# Patient Record
Sex: MALE | Race: WHITE | NOT HISPANIC OR LATINO | Employment: FULL TIME | URBAN - METROPOLITAN AREA
[De-identification: names, ages, dates, MRNs, and addresses within clinical notes are randomized per-mention and may not be internally consistent; named-entity substitution may affect disease eponyms.]

---

## 2018-08-23 ENCOUNTER — TRANSCRIBE ORDERS (OUTPATIENT)
Dept: RADIOLOGY | Age: 53
End: 2018-08-23

## 2018-08-23 ENCOUNTER — HOSPITAL ENCOUNTER (OUTPATIENT)
Dept: RADIOLOGY | Age: 53
Discharge: HOME | End: 2018-08-23
Attending: FAMILY MEDICINE
Payer: COMMERCIAL

## 2018-08-23 DIAGNOSIS — M25.561 PAIN IN RIGHT KNEE: Primary | ICD-10-CM

## 2018-08-23 DIAGNOSIS — M25.561 PAIN IN RIGHT KNEE: ICD-10-CM

## 2018-08-23 PROCEDURE — 73562 X-RAY EXAM OF KNEE 3: CPT | Mod: RT

## 2018-12-17 ENCOUNTER — APPOINTMENT (EMERGENCY)
Dept: RADIOLOGY | Facility: HOSPITAL | Age: 53
End: 2018-12-17
Payer: COMMERCIAL

## 2018-12-17 ENCOUNTER — HOSPITAL ENCOUNTER (EMERGENCY)
Facility: HOSPITAL | Age: 53
Discharge: HOME | End: 2018-12-17
Attending: EMERGENCY MEDICINE
Payer: COMMERCIAL

## 2018-12-17 VITALS
TEMPERATURE: 98.4 F | HEART RATE: 99 BPM | RESPIRATION RATE: 20 BRPM | DIASTOLIC BLOOD PRESSURE: 79 MMHG | BODY MASS INDEX: 30.48 KG/M2 | WEIGHT: 230 LBS | OXYGEN SATURATION: 97 % | SYSTOLIC BLOOD PRESSURE: 179 MMHG | HEIGHT: 73 IN

## 2018-12-17 DIAGNOSIS — S49.92XA INJURY OF LEFT SHOULDER, INITIAL ENCOUNTER: Primary | ICD-10-CM

## 2018-12-17 PROCEDURE — 73030 X-RAY EXAM OF SHOULDER: CPT | Mod: RT

## 2018-12-17 PROCEDURE — 99283 EMERGENCY DEPT VISIT LOW MDM: CPT | Mod: 25

## 2018-12-17 PROCEDURE — 93005 ELECTROCARDIOGRAM TRACING: CPT

## 2018-12-17 PROCEDURE — 93005 ELECTROCARDIOGRAM TRACING: CPT | Performed by: EMERGENCY MEDICINE

## 2018-12-17 RX ORDER — OXYCODONE AND ACETAMINOPHEN 5; 325 MG/1; MG/1
1 TABLET ORAL EVERY 4 HOURS PRN
Qty: 10 TABLET | Refills: 0 | Status: SHIPPED | OUTPATIENT
Start: 2018-12-17 | End: 2018-12-27

## 2018-12-17 RX ORDER — ROSUVASTATIN CALCIUM 10 MG/1
TABLET, COATED ORAL
COMMUNITY
End: 2024-02-26

## 2018-12-17 RX ORDER — VALSARTAN 160 MG/1
160 TABLET ORAL DAILY
COMMUNITY
End: 2024-02-26

## 2018-12-17 RX ORDER — PREDNISONE 20 MG/1
40 TABLET ORAL DAILY
Qty: 10 TABLET | Refills: 0 | Status: SHIPPED | OUTPATIENT
Start: 2018-12-17 | End: 2018-12-22

## 2018-12-17 RX ORDER — ASPIRIN 81 MG/1
81 TABLET ORAL DAILY
COMMUNITY
End: 2020-08-11

## 2018-12-17 ASSESSMENT — ENCOUNTER SYMPTOMS
NUMBNESS: 0
VOMITING: 0
SHORTNESS OF BREATH: 0
WEAKNESS: 0
ARTHRALGIAS: 1
SORE THROAT: 0
ABDOMINAL PAIN: 0
DIARRHEA: 0
COLOR CHANGE: 0
FEVER: 0
COUGH: 0

## 2018-12-17 NOTE — DISCHARGE INSTRUCTIONS
You were seen in the ED for a shoulder injury. X-rays showed no fractures. Please wear the sling provided and follow up with your PCP and the orthopedist listed below for follow up, please return to the ED with any concerns.       Anti-inflammatory dosing for NSAIDs:  Please take ibuprofen (motrin or advil) for pain and inflammation. Each tablet usually has 200 mg, please take 3 tabs (600 mg) every 6 hours or 4 tabs (800 mg) every 8 hours, around the clock for the next couple days. Please take each dose with food to prevent stomach upset. At this dose you will get the anti-inflammatory benefits of NSAIDs in addition to pain relief.

## 2018-12-17 NOTE — ED PROVIDER NOTES
HPI     Chief Complaint   Patient presents with   • Shoulder Pain     54 y/o male, right hand dominant, with pmh of AFIB on 81 mg ASA, HTN, HLD presents with left shoulder pain x 3 weeks. Pt states pain became acutely worse today. States pain is worse with movement. No pmh of rotator cuff injury or shoulder injury. Denies trauma to the area or heavy lifting. Pt has had 6 x 325mg tylenol today without relief. Pt has had percocet which he had at home with some relief.    Ortho: Dr. Estuardo Subramanian, pt has appointment on Friday 12/21/18      History provided by:  Patient   used: No    Shoulder Pain   Location:  Left  Quality:  Dull ache  Severity:  Moderate  Onset quality:  Gradual  Duration:  3 weeks  Timing:  Constant  Progression:  Worsening  Chronicity:  New  Worsened by:  Movements  Ineffective treatments:  Tylenol  Associated symptoms: no abdominal pain, no chest pain, no cough, no diarrhea, no fever, no shortness of breath, no sore throat and no vomiting         Patient History     Past Medical History:   Diagnosis Date   • Atrial fibrillation (CMS/HCC)    • Hypertension    • Kidney stone    • Lipid disorder        Past Surgical History:   Procedure Laterality Date   • CARDIAC ELECTROPHYSIOLOGY MAPPING AND ABLATION     • LITHOTRIPSY         History reviewed. No pertinent family history.    Social History   Substance Use Topics   • Smoking status: Never Smoker   • Smokeless tobacco: Never Used   • Alcohol use Yes       Systems Reviewed from Nursing Triage:          Review of Systems     Review of Systems   Constitutional: Negative for fever.   HENT: Negative for sore throat.    Respiratory: Negative for cough and shortness of breath.    Cardiovascular: Negative for chest pain.   Gastrointestinal: Negative for abdominal pain, diarrhea and vomiting.   Musculoskeletal: Positive for arthralgias (left shoulder pain).   Skin: Negative for color change.   Neurological: Negative for weakness and  "numbness.        Physical Exam     ED Triage Vitals [12/17/18 1444]   Temp Heart Rate Resp BP SpO2   36.9 °C (98.4 °F) 99 20 (!) 179/79 97 %      Temp Source Heart Rate Source Patient Position BP Location FiO2 (%) (Set)   Tympanic -- -- -- --                     Patient Vitals for the past 24 hrs:   BP Temp Temp src Pulse Resp SpO2 Height Weight   12/17/18 1444 (!) 179/79 36.9 °C (98.4 °F) Tympanic 99 20 97 % 1.854 m (6' 1\") 104 kg (230 lb)           Physical Exam   Constitutional: He is oriented to person, place, and time. He appears well-developed. No distress.   HENT:   Head: Atraumatic.   Eyes: Conjunctivae are normal.   Neck: Normal range of motion.   Pulmonary/Chest: Effort normal.   Musculoskeletal: He exhibits no deformity.        Left shoulder: He exhibits decreased range of motion, tenderness and pain. He exhibits no deformity and normal pulse.   Clavicle and scapula is nontender  Hawking-Logan test positive  Mike lift off test positive  NV intact   Neurological: He is alert and oriented to person, place, and time.   Skin: Skin is warm and dry.   Psychiatric: He has a normal mood and affect. His behavior is normal.   Nursing note and vitals reviewed.           Procedures    ED Course & [facility]     Labs Reviewed - No data to display    ECG 12 lead    (Results Pending)   X-RAY SHOULDER RIGHT 2+ VIEWS    (Results Pending)               [facility]         ED Course as of Dec 17 1634   Mon Dec 17, 2018   1532 Pt at XR. Will examine him after.  [KM]   1609 IMPRESSION:  No evidence of acute fracture or malalignment. X-RAY SHOULDER RIGHT 2+ VIEWS [KM]   1633 Discharge with percocet and prednisone prescription. Pt has close follow up with Dr. Subramanian, 12/21/18.  [KM]      ED Course User Index  [KM] Erica Snider         Clinical Impressions as of Dec 17 1634   Injury of left shoulder, initial encounter      By signing my name below, I, Erica Snider, attest that this documentation has been prepared under the direction and " in the presence of RAE Love PA-C.    12/17/2018 3:29 PM      I, Lori Love, personally performed the services described in this documentation, as documented by the scribe in my presence, and it is both accurate and complete.  12/18/2018 12:12 AM       Erica Snider  12/17/18 1637       Lori Campbell PA C  12/18/18 0012

## 2018-12-18 LAB
ATRIAL RATE: 100
P AXIS: -3
PR INTERVAL: 158
QRS DURATION: 72
QT INTERVAL: 328
QTC CALCULATION(BAZETT): 423
R AXIS: 63
T WAVE AXIS: 6
VENTRICULAR RATE: 100

## 2018-12-19 NOTE — ED ATTESTATION NOTE
The patient was evaluated and managed by the physician assistant / nurse practitioner.     Ness East MD  12/19/18 9117

## 2019-01-30 ENCOUNTER — TRANSCRIBE ORDERS (OUTPATIENT)
Dept: SCHEDULING | Age: 54
End: 2019-01-30

## 2019-01-30 ENCOUNTER — HOSPITAL ENCOUNTER (OUTPATIENT)
Dept: RADIOLOGY | Facility: HOSPITAL | Age: 54
Discharge: HOME | End: 2019-01-30
Attending: FAMILY MEDICINE
Payer: COMMERCIAL

## 2019-01-30 DIAGNOSIS — M25.512 PAIN IN LEFT SHOULDER: ICD-10-CM

## 2019-01-30 DIAGNOSIS — M25.512 PAIN IN LEFT SHOULDER: Primary | ICD-10-CM

## 2019-05-27 ENCOUNTER — HOSPITAL ENCOUNTER (EMERGENCY)
Facility: HOSPITAL | Age: 54
Discharge: HOME | End: 2019-05-27
Attending: EMERGENCY MEDICINE
Payer: COMMERCIAL

## 2019-05-27 VITALS
SYSTOLIC BLOOD PRESSURE: 141 MMHG | BODY MASS INDEX: 31.15 KG/M2 | OXYGEN SATURATION: 98 % | RESPIRATION RATE: 16 BRPM | WEIGHT: 230 LBS | TEMPERATURE: 98.4 F | HEART RATE: 81 BPM | DIASTOLIC BLOOD PRESSURE: 95 MMHG | HEIGHT: 72 IN

## 2019-05-27 DIAGNOSIS — H66.001 ACUTE SUPPURATIVE OTITIS MEDIA OF RIGHT EAR WITHOUT SPONTANEOUS RUPTURE OF TYMPANIC MEMBRANE, RECURRENCE NOT SPECIFIED: ICD-10-CM

## 2019-05-27 DIAGNOSIS — J01.00 ACUTE NON-RECURRENT MAXILLARY SINUSITIS: Primary | ICD-10-CM

## 2019-05-27 LAB
ALBUMIN SERPL-MCNC: 3.9 G/DL (ref 3.4–5)
ALP SERPL-CCNC: 75 IU/L (ref 35–126)
ALT SERPL-CCNC: 57 IU/L (ref 16–63)
ANION GAP SERPL CALC-SCNC: 11 MEQ/L (ref 3–15)
AST SERPL-CCNC: 27 IU/L (ref 15–41)
BASOPHILS # BLD: 0.07 K/UL (ref 0.01–0.1)
BASOPHILS NFR BLD: 0.6 %
BILIRUB SERPL-MCNC: 1.4 MG/DL (ref 0.3–1.2)
BUN SERPL-MCNC: 15 MG/DL (ref 8–20)
CALCIUM SERPL-MCNC: 8.7 MG/DL (ref 8.9–10.3)
CHLORIDE SERPL-SCNC: 106 MEQ/L (ref 98–109)
CO2 SERPL-SCNC: 21 MEQ/L (ref 22–32)
CREAT SERPL-MCNC: 0.9 MG/DL
DIFFERENTIAL METHOD BLD: ABNORMAL
EOSINOPHIL # BLD: 0.28 K/UL (ref 0.04–0.54)
EOSINOPHIL NFR BLD: 2.5 %
ERYTHROCYTE [DISTWIDTH] IN BLOOD BY AUTOMATED COUNT: 13 % (ref 11.6–14.4)
GFR SERPL CREATININE-BSD FRML MDRD: >60 ML/MIN/1.73M*2
GLUCOSE SERPL-MCNC: 167 MG/DL (ref 70–99)
HCT VFR BLDCO AUTO: 47.6 %
HGB BLD-MCNC: 16 G/DL
IMM GRANULOCYTES # BLD AUTO: 0.03 K/UL (ref 0–0.08)
IMM GRANULOCYTES NFR BLD AUTO: 0.3 %
LYMPHOCYTES # BLD: 2.54 K/UL (ref 1.2–3.5)
LYMPHOCYTES NFR BLD: 22.4 %
MCH RBC QN AUTO: 28.8 PG (ref 28–33.2)
MCHC RBC AUTO-ENTMCNC: 33.6 G/DL (ref 32.2–36.5)
MCV RBC AUTO: 85.8 FL (ref 83–98)
MONOCYTES # BLD: 0.91 K/UL (ref 0.3–1)
MONOCYTES NFR BLD: 8 %
NEUTROPHILS # BLD: 7.5 K/UL (ref 1.7–7)
NEUTS SEG NFR BLD: 66.2 %
NRBC BLD-RTO: 0 %
PDW BLD AUTO: 9.3 FL (ref 9.4–12.4)
PLATELET # BLD AUTO: 272 K/UL
POTASSIUM SERPL-SCNC: 3.6 MEQ/L (ref 3.6–5.1)
PROT SERPL-MCNC: 6.8 G/DL (ref 6–8.2)
RBC # BLD AUTO: 5.55 M/UL (ref 4.5–5.8)
SODIUM SERPL-SCNC: 138 MEQ/L (ref 136–144)
WBC # BLD AUTO: 11.33 K/UL

## 2019-05-27 PROCEDURE — 80053 COMPREHEN METABOLIC PANEL: CPT | Performed by: PHYSICIAN ASSISTANT

## 2019-05-27 PROCEDURE — 36415 COLL VENOUS BLD VENIPUNCTURE: CPT | Performed by: PHYSICIAN ASSISTANT

## 2019-05-27 PROCEDURE — 63600000 HC DRUGS/DETAIL CODE: Performed by: PHYSICIAN ASSISTANT

## 2019-05-27 PROCEDURE — 25800000 HC PHARMACY IV SOLUTIONS: Performed by: PHYSICIAN ASSISTANT

## 2019-05-27 PROCEDURE — 87081 CULTURE SCREEN ONLY: CPT | Performed by: EMERGENCY MEDICINE

## 2019-05-27 PROCEDURE — 85025 COMPLETE CBC W/AUTO DIFF WBC: CPT | Performed by: PHYSICIAN ASSISTANT

## 2019-05-27 PROCEDURE — 63700000 HC SELF-ADMINISTRABLE DRUG: Performed by: PHYSICIAN ASSISTANT

## 2019-05-27 PROCEDURE — 99284 EMERGENCY DEPT VISIT MOD MDM: CPT

## 2019-05-27 RX ORDER — DIPHENHYDRAMINE HCL 50 MG/ML
25 VIAL (ML) INJECTION ONCE
Status: COMPLETED | OUTPATIENT
Start: 2019-05-27 | End: 2019-05-27

## 2019-05-27 RX ORDER — AMOXICILLIN AND CLAVULANATE POTASSIUM 875; 125 MG/1; MG/1
1 TABLET, FILM COATED ORAL
Qty: 14 TABLET | Refills: 0 | Status: SHIPPED | OUTPATIENT
Start: 2019-05-27 | End: 2019-05-27

## 2019-05-27 RX ORDER — ACETAMINOPHEN 325 MG/1
975 TABLET ORAL ONCE
Status: COMPLETED | OUTPATIENT
Start: 2019-05-27 | End: 2019-05-27

## 2019-05-27 RX ORDER — METOCLOPRAMIDE HYDROCHLORIDE 5 MG/ML
10 INJECTION INTRAMUSCULAR; INTRAVENOUS ONCE
Status: COMPLETED | OUTPATIENT
Start: 2019-05-27 | End: 2019-05-27

## 2019-05-27 RX ORDER — AMOXICILLIN AND CLAVULANATE POTASSIUM 875; 125 MG/1; MG/1
1 TABLET, FILM COATED ORAL
Qty: 20 TABLET | Refills: 0 | Status: SHIPPED | OUTPATIENT
Start: 2019-05-27 | End: 2019-06-06

## 2019-05-27 RX ADMIN — SODIUM CHLORIDE 1000 ML: 9 INJECTION, SOLUTION INTRAVENOUS at 12:08

## 2019-05-27 RX ADMIN — METOCLOPRAMIDE 10 MG: 5 INJECTION, SOLUTION INTRAMUSCULAR; INTRAVENOUS at 12:15

## 2019-05-27 RX ADMIN — ACETAMINOPHEN 975 MG: 325 TABLET, FILM COATED ORAL at 12:09

## 2019-05-27 RX ADMIN — DIPHENHYDRAMINE HYDROCHLORIDE 25 MG: 50 INJECTION INTRAMUSCULAR; INTRAVENOUS at 12:13

## 2019-05-29 LAB — B-HEM STREP SPEC QL CULT: NORMAL

## 2020-08-11 ENCOUNTER — APPOINTMENT (EMERGENCY)
Dept: RADIOLOGY | Facility: HOSPITAL | Age: 55
End: 2020-08-11
Payer: COMMERCIAL

## 2020-08-11 ENCOUNTER — APPOINTMENT (EMERGENCY)
Dept: RADIOLOGY | Facility: HOSPITAL | Age: 55
End: 2020-08-11
Attending: EMERGENCY MEDICINE
Payer: COMMERCIAL

## 2020-08-11 ENCOUNTER — HOSPITAL ENCOUNTER (EMERGENCY)
Facility: HOSPITAL | Age: 55
Discharge: HOME | End: 2020-08-11
Attending: EMERGENCY MEDICINE
Payer: COMMERCIAL

## 2020-08-11 VITALS
SYSTOLIC BLOOD PRESSURE: 102 MMHG | HEIGHT: 74 IN | OXYGEN SATURATION: 96 % | WEIGHT: 235 LBS | TEMPERATURE: 98.8 F | HEART RATE: 80 BPM | RESPIRATION RATE: 16 BRPM | BODY MASS INDEX: 30.16 KG/M2 | DIASTOLIC BLOOD PRESSURE: 61 MMHG

## 2020-08-11 DIAGNOSIS — J18.9 PNEUMONIA DUE TO INFECTIOUS ORGANISM, UNSPECIFIED LATERALITY, UNSPECIFIED PART OF LUNG: Primary | ICD-10-CM

## 2020-08-11 DIAGNOSIS — E87.6 HYPOKALEMIA: ICD-10-CM

## 2020-08-11 DIAGNOSIS — K82.8 GALLBLADDER SLUDGE: ICD-10-CM

## 2020-08-11 LAB
ALBUMIN SERPL-MCNC: 4.2 G/DL (ref 3.4–5)
ALP SERPL-CCNC: 66 IU/L (ref 35–126)
ALT SERPL-CCNC: 36 IU/L (ref 16–63)
ANION GAP SERPL CALC-SCNC: 13 MEQ/L (ref 3–15)
AST SERPL-CCNC: 25 IU/L (ref 15–41)
BACTERIA #/AREA URNS HPF: ABNORMAL /HPF
BASOPHILS # BLD: 0.08 K/UL (ref 0.01–0.1)
BASOPHILS NFR BLD: 0.6 %
BILIRUB SERPL-MCNC: 2.7 MG/DL (ref 0.3–1.2)
BILIRUB UR QL STRIP.AUTO: 1 MG/DL
BUN SERPL-MCNC: 21 MG/DL (ref 8–20)
CALCIUM SERPL-MCNC: 8.8 MG/DL (ref 8.9–10.3)
CHLORIDE SERPL-SCNC: 98 MEQ/L (ref 98–109)
CLARITY UR REFRACT.AUTO: ABNORMAL
CO2 SERPL-SCNC: 24 MEQ/L (ref 22–32)
COLOR UR AUTO: ABNORMAL
CREAT SERPL-MCNC: 1 MG/DL (ref 0.8–1.3)
DIFFERENTIAL METHOD BLD: ABNORMAL
EOSINOPHIL # BLD: 0.27 K/UL (ref 0.04–0.54)
EOSINOPHIL NFR BLD: 2 %
ERYTHROCYTE [DISTWIDTH] IN BLOOD BY AUTOMATED COUNT: 13.2 % (ref 11.6–14.4)
GFR SERPL CREATININE-BSD FRML MDRD: >60 ML/MIN/1.73M*2
GLUCOSE SERPL-MCNC: 112 MG/DL (ref 70–99)
GLUCOSE UR STRIP.AUTO-MCNC: NEGATIVE MG/DL
GRAN CASTS #/AREA URNS LPF: ABNORMAL /LPF
HCT VFR BLDCO AUTO: 46.6 % (ref 40.1–51)
HGB BLD-MCNC: 15.5 G/DL (ref 13.7–17.5)
HGB UR QL STRIP.AUTO: NEGATIVE
HYALINE CASTS #/AREA URNS LPF: ABNORMAL /LPF
IMM GRANULOCYTES # BLD AUTO: 0.04 K/UL (ref 0–0.08)
IMM GRANULOCYTES NFR BLD AUTO: 0.3 %
KETONES UR STRIP.AUTO-MCNC: NEGATIVE MG/DL
LACTATE SERPL-SCNC: 1.1 MMOL/L (ref 0.4–2)
LEUKOCYTE ESTERASE UR QL STRIP.AUTO: ABNORMAL
LIPASE SERPL-CCNC: 22 U/L (ref 20–51)
LYMPHOCYTES # BLD: 2.38 K/UL (ref 1.2–3.5)
LYMPHOCYTES NFR BLD: 17.9 %
MCH RBC QN AUTO: 28.1 PG (ref 28–33.2)
MCHC RBC AUTO-ENTMCNC: 33.3 G/DL (ref 32.2–36.5)
MCV RBC AUTO: 84.6 FL (ref 83–98)
MONOCYTES # BLD: 1.24 K/UL (ref 0.3–1)
MONOCYTES NFR BLD: 9.3 %
NEUTROPHILS # BLD: 9.3 K/UL (ref 1.7–7)
NEUTS SEG NFR BLD: 69.9 %
NITRITE UR QL STRIP.AUTO: NEGATIVE
NRBC BLD-RTO: 0 %
PDW BLD AUTO: 9.4 FL (ref 9.4–12.4)
PH UR STRIP.AUTO: 5.5 [PH]
PLATELET # BLD AUTO: 294 K/UL (ref 150–350)
POTASSIUM SERPL-SCNC: 3 MEQ/L (ref 3.6–5.1)
PROT SERPL-MCNC: 7.7 G/DL (ref 6–8.2)
PROT UR QL STRIP.AUTO: 1
RBC # BLD AUTO: 5.51 M/UL (ref 4.5–5.8)
RBC #/AREA URNS HPF: ABNORMAL /HPF
SODIUM SERPL-SCNC: 135 MEQ/L (ref 136–144)
SP GR UR REFRACT.AUTO: 1.03
SQUAMOUS URNS QL MICRO: 1 /HPF
TROPONIN I SERPL-MCNC: <0.02 NG/ML
TSH SERPL DL<=0.05 MIU/L-ACNC: 3.89 MIU/L (ref 0.34–5.6)
UROBILINOGEN UR STRIP-ACNC: 1 EU/DL
WBC # BLD AUTO: 13.31 K/UL (ref 3.8–10.5)
WBC #/AREA URNS HPF: ABNORMAL /HPF

## 2020-08-11 PROCEDURE — 87086 URINE CULTURE/COLONY COUNT: CPT | Performed by: NURSE PRACTITIONER

## 2020-08-11 PROCEDURE — 25800000 HC PHARMACY IV SOLUTIONS: Performed by: NURSE PRACTITIONER

## 2020-08-11 PROCEDURE — 83605 ASSAY OF LACTIC ACID: CPT

## 2020-08-11 PROCEDURE — 87040 BLOOD CULTURE FOR BACTERIA: CPT | Mod: 91 | Performed by: EMERGENCY MEDICINE

## 2020-08-11 PROCEDURE — U0002 COVID-19 LAB TEST NON-CDC: HCPCS | Performed by: NURSE PRACTITIONER

## 2020-08-11 PROCEDURE — 96374 THER/PROPH/DIAG INJ IV PUSH: CPT

## 2020-08-11 PROCEDURE — 82040 ASSAY OF SERUM ALBUMIN: CPT | Performed by: EMERGENCY MEDICINE

## 2020-08-11 PROCEDURE — 83690 ASSAY OF LIPASE: CPT | Performed by: NURSE PRACTITIONER

## 2020-08-11 PROCEDURE — 76705 ECHO EXAM OF ABDOMEN: CPT

## 2020-08-11 PROCEDURE — 63600000 HC DRUGS/DETAIL CODE

## 2020-08-11 PROCEDURE — 96361 HYDRATE IV INFUSION ADD-ON: CPT

## 2020-08-11 PROCEDURE — 85025 COMPLETE CBC W/AUTO DIFF WBC: CPT

## 2020-08-11 PROCEDURE — 83605 ASSAY OF LACTIC ACID: CPT | Performed by: EMERGENCY MEDICINE

## 2020-08-11 PROCEDURE — 81001 URINALYSIS AUTO W/SCOPE: CPT | Performed by: NURSE PRACTITIONER

## 2020-08-11 PROCEDURE — 3E0337Z INTRODUCTION OF ELECTROLYTIC AND WATER BALANCE SUBSTANCE INTO PERIPHERAL VEIN, PERCUTANEOUS APPROACH: ICD-10-PCS | Performed by: EMERGENCY MEDICINE

## 2020-08-11 PROCEDURE — 36415 COLL VENOUS BLD VENIPUNCTURE: CPT

## 2020-08-11 PROCEDURE — 84443 ASSAY THYROID STIM HORMONE: CPT | Performed by: NURSE PRACTITIONER

## 2020-08-11 PROCEDURE — 71045 X-RAY EXAM CHEST 1 VIEW: CPT

## 2020-08-11 PROCEDURE — 3E0333Z INTRODUCTION OF ANTI-INFLAMMATORY INTO PERIPHERAL VEIN, PERCUTANEOUS APPROACH: ICD-10-PCS | Performed by: EMERGENCY MEDICINE

## 2020-08-11 PROCEDURE — 63600000 HC DRUGS/DETAIL CODE: Performed by: NURSE PRACTITIONER

## 2020-08-11 PROCEDURE — 99284 EMERGENCY DEPT VISIT MOD MDM: CPT | Mod: 25

## 2020-08-11 PROCEDURE — 84484 ASSAY OF TROPONIN QUANT: CPT | Performed by: NURSE PRACTITIONER

## 2020-08-11 PROCEDURE — 93005 ELECTROCARDIOGRAM TRACING: CPT | Performed by: NURSE PRACTITIONER

## 2020-08-11 PROCEDURE — 85025 COMPLETE CBC W/AUTO DIFF WBC: CPT | Performed by: EMERGENCY MEDICINE

## 2020-08-11 PROCEDURE — 63700000 HC SELF-ADMINISTRABLE DRUG: Performed by: NURSE PRACTITIONER

## 2020-08-11 RX ORDER — ONDANSETRON HYDROCHLORIDE 2 MG/ML
4 INJECTION, SOLUTION INTRAVENOUS ONCE
Status: COMPLETED | OUTPATIENT
Start: 2020-08-11 | End: 2020-08-11

## 2020-08-11 RX ORDER — ONDANSETRON HYDROCHLORIDE 2 MG/ML
INJECTION, SOLUTION INTRAVENOUS
Status: COMPLETED
Start: 2020-08-11 | End: 2020-08-11

## 2020-08-11 RX ORDER — CEFUROXIME AXETIL 500 MG/1
500 TABLET ORAL 2 TIMES DAILY
Qty: 13 TABLET | Refills: 0 | Status: SHIPPED | OUTPATIENT
Start: 2020-08-11 | End: 2020-08-18

## 2020-08-11 RX ORDER — POTASSIUM CHLORIDE 750 MG/1
40 TABLET, FILM COATED, EXTENDED RELEASE ORAL ONCE
Status: COMPLETED | OUTPATIENT
Start: 2020-08-11 | End: 2020-08-11

## 2020-08-11 RX ORDER — CEFUROXIME AXETIL 250 MG/1
500 TABLET ORAL ONCE
Status: COMPLETED | OUTPATIENT
Start: 2020-08-11 | End: 2020-08-11

## 2020-08-11 RX ORDER — AZITHROMYCIN 250 MG/1
250 TABLET, FILM COATED ORAL DAILY
Qty: 4 TABLET | Refills: 0 | Status: SHIPPED | OUTPATIENT
Start: 2020-08-12 | End: 2020-08-16

## 2020-08-11 RX ORDER — AZITHROMYCIN 250 MG/1
500 TABLET, FILM COATED ORAL ONCE
Status: COMPLETED | OUTPATIENT
Start: 2020-08-11 | End: 2020-08-11

## 2020-08-11 RX ORDER — ONDANSETRON 4 MG/1
4 TABLET, ORALLY DISINTEGRATING ORAL EVERY 8 HOURS PRN
Qty: 5 TABLET | Refills: 0 | Status: SHIPPED | OUTPATIENT
Start: 2020-08-11 | End: 2021-05-21

## 2020-08-11 RX ORDER — KETOROLAC TROMETHAMINE 15 MG/ML
15 INJECTION, SOLUTION INTRAMUSCULAR; INTRAVENOUS ONCE
Status: COMPLETED | OUTPATIENT
Start: 2020-08-11 | End: 2020-08-11

## 2020-08-11 RX ADMIN — ONDANSETRON HYDROCHLORIDE 4 MG: 2 INJECTION, SOLUTION INTRAVENOUS at 15:43

## 2020-08-11 RX ADMIN — ONDANSETRON 4 MG: 2 INJECTION INTRAMUSCULAR; INTRAVENOUS at 15:43

## 2020-08-11 RX ADMIN — POTASSIUM CHLORIDE 40 MEQ: 750 TABLET, EXTENDED RELEASE ORAL at 17:35

## 2020-08-11 RX ADMIN — KETOROLAC TROMETHAMINE 15 MG: 15 INJECTION, SOLUTION INTRAMUSCULAR; INTRAVENOUS at 16:24

## 2020-08-11 RX ADMIN — CEFUROXIME AXETIL 500 MG: 250 TABLET ORAL at 17:34

## 2020-08-11 RX ADMIN — SODIUM CHLORIDE 1000 ML: 9 INJECTION, SOLUTION INTRAVENOUS at 16:23

## 2020-08-11 RX ADMIN — AZITHROMYCIN MONOHYDRATE 500 MG: 250 TABLET ORAL at 17:34

## 2020-08-11 NOTE — DISCHARGE INSTRUCTIONS
You have been tested for Covid 19 today.  We have collected the specimen and it is awaiting results.  Please contact the local health department, yours is saniya at [phone] or your primary care doctor about your testing sample, questions and results. Testing results at this time are returning within 3-5 days on average, however this may change according to demand.  In the meantime please self quarantine and limit your contact with others.  Be sure to use proper universal precautions such as washing your hands and covering your sneezes/coughs.     If you test positive, please self isolate at home until it has been at least seven days since your symptoms first appeared AND if has been at least three days since you have had a fever without using fever reducing medications AND your respiratory symptoms are improving.     If you test negative and did not have a known exposure to someone with Covid 19 and DO NOT have symptoms you can stop self quarantine. If you test negative with a known exposure to a confirmed case continue to quarantine until 14 days after your exposure. If you test negative and are symptomatic, avoid work and group settings until five days after the last day of your respiratory symptoms AND fever free for 72 hours without fever reducing medications    If you have any questions regarding the appropriate measures/isolation/return to work after your testing, please speak with your primary care doctor, occupational health specialist or you may call the emergency department.    Please return immediately for any new, worsening or concerning symptoms such as new/worsening pain, chest pain, shortness of breath, confusion, lethargy, chest pain, etc    You make take acetaminophen i.e. Tylenol as per package instructions for fever control.  Please be sure to keep track of how much you are taking for you should not receive more than 3 g of acetaminophen in 24 hours.  Please be sure to drink plenty of  fluids    Please follow a clear liquid diet and progress to bland as tolerated. Avoid spicy foods, alcohol, lactose/dairy containing foods until bland diet is well tolerated. Drink plenty of non-sugary, no caffeine liquids to stay hydrated.    Some of your lab work today resulted as abnormal (bili).  Please be sure to discuss this with your primary care doctor upon follow-up visit for further evaluation and testing.    Please be sure to mention your cat scan to your primary care doctor so they may follow the incidental findings found on your US so they may follow and perform additional testing/imaging if needed

## 2020-08-11 NOTE — ED ATTESTATION NOTE
I have personally seen and examined the patient.  I reviewed and agree with physician assistant / nurse practitioner’s assessment and plan of care, with the following exceptions: None  My examination, assessment, and plan of care of Ino Carrion is as follows:     54-year-old male who comes in complaining of low-grade fever fatigue myalgias.  Denies cough.  No nausea vomiting diarrhea.  Also having some epigastric abdominal pain.  Does have a history of gastritis.  Does take half a tablet of Nexium a day.    Well-developed male no obvious distress.  HEENT is unremarkable.  No respiratory distress.  Awake alert and oriented.    Patient's chest x-ray read by radiology as right lower lung field infiltrate.  Ultrasound of the abdomen showed some gallbladder sludge.  Laboratory studies notable for mild hypokalemia otherwise no significant abnormalities.  Due to the infiltrate will send off an outpatient COVID.  We will start the patient on outpatient pneumonia antibiotics.  Agree with NP findings and plan.     Tristin Walker MD  08/11/20 8045

## 2020-08-11 NOTE — ED PROVIDER NOTES
History provided by patient:    Patient with history of A. fib, hypertension, kidney stones and hyperlipidemia.  Patient states starting on Friday having malaise and myalgias.  On Saturday with a low-grade fever of 99 that increased on Sunday to 101 to 102 °F.  Patient states having decreased p.o. intake, nausea, postnasal drip and intermittent epigastric abdominal pain for which she is experiencing none now.  No precipitating, alleviating or aggravating factors.  Is pain-free now.  Is never smears this in the past.  No recent trauma.  Attempted tramadol for his symptoms without improvement.  Patient denies any recent sick contacts or travel.  Patient denies cough, sore throat, changes to hearing/and/speech, dizziness, chest pain, changes to urine or stool, numbness, weakness, rash or any other symptoms.    HPI    Past Medical History:   Diagnosis Date   • Atrial fibrillation (CMS/HCC)    • Hypertension    • Kidney stone    • Lipid disorder        Past Surgical History:   Procedure Laterality Date   • CARDIAC ELECTROPHYSIOLOGY MAPPING AND ABLATION     • LITHOTRIPSY         Allergies   Allergen Reactions   • No Known Drug Allergies        Social History     Tobacco Use   Smoking Status Never Smoker   Smokeless Tobacco Never Used       Social History     Substance and Sexual Activity   Alcohol Use Yes       Social History     Substance and Sexual Activity   Drug Use No       No LMP for male patient.    Review of Systems   Constitutional: Positive for appetite change and fever. Negative for fatigue.   HENT: Positive for postnasal drip. Negative for congestion, ear pain, sore throat and trouble swallowing.    Eyes: Negative for pain, redness and visual disturbance.   Respiratory: Negative for cough and shortness of breath.    Cardiovascular: Negative for chest pain.   Gastrointestinal: Positive for abdominal pain and nausea. Negative for blood in stool, constipation, diarrhea and vomiting.   Genitourinary: Negative for  "difficulty urinating, discharge, dysuria, flank pain, penile pain and testicular pain.   Musculoskeletal: Positive for myalgias. Negative for back pain and neck pain.   Skin: Negative for color change and rash.   Neurological: Negative for dizziness, weakness, numbness and headaches.   Psychiatric/Behavioral: Negative for confusion.       Vitals:    08/11/20 1515 08/11/20 1559 08/11/20 1722   BP: 118/65 (!) 102/59 102/61   BP Location: Right upper arm     Patient Position: Sitting     Pulse: 94 67 80   Resp: 16 18 16   Temp: 37 °C (98.6 °F)  37.1 °C (98.8 °F)   TempSrc: Temporal     SpO2: 95% 98% 96%   Weight: 107 kg (235 lb)     Height: 1.88 m (6' 2\")         Physical Exam   Constitutional: He appears well-developed and well-nourished.  Non-toxic appearance. He does not appear ill. No distress.   HENT:   Head: Normocephalic and atraumatic.   Mouth/Throat: Mucous membranes are normal.   Airway intact   Eyes: Conjunctivae are normal.   Cardiovascular: Normal rate, regular rhythm and normal heart sounds.   Pulmonary/Chest: Effort normal and breath sounds normal. No respiratory distress.   Abdominal: Soft. Normal appearance and bowel sounds are normal. He exhibits no distension and no mass. There is no tenderness. There is no rigidity, no rebound, no guarding, no CVA tenderness and negative Love's sign.   Musculoskeletal: He exhibits no edema.   Neurological: He is alert.   Skin: Skin is warm and dry. No rash noted.   Psychiatric: He has a normal mood and affect. His behavior is normal.   Speech normal   Nursing note and vitals reviewed.      Procedures    ED Course as of Aug 12 1357   Tue Aug 11, 2020   1607 Impression    Patient with myalgias, fever, epigastric abdominal pain and nausea.  Patient well-appearing and nontoxic no fevers here.  No antipyretics.  Abdomen soft and nontender.      Plan-labs, lipase, trop, tsh, ekg, cxr, ua, us RUQ, zofran, ivf    [DUNIA]   1608 Lactate: 1.1 [DUNIA]   1609 WBC(!): 13.31 [DUNIA] "   1649 1.4 previously. Remaining lfts unremarkable. No abd tenderness   Bilirubin, Total(!): 2.7 [DUNIA]   1707 Xr IMPRESSION: Hazy opacity right lower lung concerning for infiltrate/pneumonia  with adjacent subsegmental atelectasis.    [DUNIA]   1721 US IMPRESSION: Gallbladder sludge.  No evidence for acute cholecystitis.    [DUNIA]   1721 Will replace   Potassium(!): 3.0 [DUNIA]   1721 Patient with pneumonia.  With some leukocytosis however no fevers here and nontoxic.  We will test patient outpatient for Covid however will cover with ceftin and azithromycin patient's curb 65 score low recommends outpatient treatment.  Patient also with some hypokalemia that was replaced.  With gallbladder sludge however no stones or ductal dilation.  Some increase in bili.  We will discharge patient to follow-up with PCP and GI as needed.    Patient discussed with Dr jimenez, agreeable to workup/plan. Has seen patient    To patient's bedside. Patient exam unchanged. Patient sitting quietly and comfortably in the stretcher.  Discussed results and plan with patient.  Patient verbalized understanding and agreeable without any questions or concerns.      Extensive education provided on signs and symptoms that would warrant a return visit to the emergency department along with emphasis on importance of follow-up.  Patient verbalized understanding and agreeable.  All questions answered      [DUNIA]      ED Course User Index  [DUNIA] Kimi De La Garza CRNP         Clinical Impressions as of Aug 12 1357   Pneumonia due to infectious organism, unspecified laterality, unspecified part of lung   Hypokalemia   Gallbladder sludge       Final diagnoses:   [J18.9] Pneumonia due to infectious organism, unspecified laterality, unspecified part of lung   [E87.6] Hypokalemia   [K82.8] Gallbladder sludge       Labs Reviewed   COMPREHENSIVE METABOLIC PANEL - Abnormal       Result Value    Sodium 135 (*)     Potassium 3.0 (*)     Chloride 98      CO2 24      BUN 21 (*)      Creatinine 1.0      Glucose 112 (*)     Calcium 8.8 (*)     AST (SGOT) 25      ALT (SGPT) 36      Alkaline Phosphatase 66      Total Protein 7.7      Albumin 4.2      Bilirubin, Total 2.7 (*)     eGFR >60.0      Anion Gap 13     CBC AND DIFF - Abnormal    WBC 13.31 (*)     RBC 5.51      Hemoglobin 15.5      Hematocrit 46.6      MCV 84.6      MCH 28.1      MCHC 33.3      RDW 13.2      Platelets 294      MPV 9.4      Differential Type Auto      nRBC 0.0      Immature Granulocytes 0.3      Neutrophils 69.9      Lymphocytes 17.9      Monocytes 9.3      Eosinophils 2.0      Basophils 0.6      Immature Granulocytes, Absolute 0.04      Neutrophils, Absolute 9.30 (*)     Lymphocytes, Absolute 2.38      Monocytes, Absolute 1.24 (*)     Eosinophils, Absolute 0.27      Basophils, Absolute 0.08     UA REFLEX CULTURE (MACROSCOPIC) - Abnormal    Color, Urine Izzy (*)     Clarity, Urine Cloudy (*)     Specific Gravity, Urine 1.031 (*)     pH, Urine 5.5      Leukocyte Esterase Trace (*)     Nitrite, Urine Negative      Protein, Urine +1 (*)     Glucose, Urine Negative      Ketones, Urine Negative      Urobilinogen, Urine 1.0      Bilirubin, Urine +1 (*)     Blood, Urine Negative     UA MICROSCOPIC - Abnormal    WBC, Urine 4 TO 10 (*)     Squamous Epithelial +1 (*)     Hyaline Cast 20 TO 34 (*)     RBC, Urine 0 TO 4      Bacteria, Urine Rare (*)     Granular Cast 0 TO 2 (*)    LACTATE, VENOUS - Normal    Lactate 1.1     TROPONIN I - Normal    Troponin I <0.02     LIPASE - Normal    Lipase 22     TSH 3RD GENERATION W/REFLEX FT4 - Normal    TSH 3.89     BLOOD CULTURE   BLOOD CULTURE   URINE CULTURE   URINALYSIS REFLEX CULTURE (ED AND OUTPATIENT ONLY)    Narrative:     The following orders were created for panel order UA with reflex culture.  Procedure                               Abnormality         Status                     ---------                               -----------         ------                     UA Reflex to  Culture (Ma...[[id number]]  Abnormal            Final result               UA Microscopic[[id number]]               Abnormal            Final result                 Please view results for these tests on the individual orders.   RAINBOW DRAW PANEL    Narrative:     The following orders were created for panel order [place] Draw Panel.  Procedure                               Abnormality         Status                     ---------                               -----------         ------                     RAINBOW RED[[id number]]                                      Final result               RAINBOW LT BLUE[[id number]]                                  Final result               RAINBOW GOLD[[id number]]                                     Final result                 Please view results for these tests on the individual orders.   COVID-19 QUEST SARS-COV-2 RNA, QUALITATIVE NAAT   RAINBOW RED   RAINBOW LT BLUE   RAINBOW GOLD       ULTRASOUND ABDOMEN LIMITED   Final Result   IMPRESSION: Gallbladder sludge.  No evidence for acute cholecystitis.      X-RAY CHEST 1 VIEW   Final Result   IMPRESSION: Hazy opacity right lower lung concerning for infiltrate/pneumonia   with adjacent subsegmental atelectasis.      ECG 12 lead                  Kimi De La Garza, DC  08/12/20 0272

## 2020-08-12 LAB
ATRIAL RATE: 86
BACTERIA UR CULT: NORMAL
P AXIS: 14
PR INTERVAL: 170
QRS DURATION: 74
QT INTERVAL: 356
QTC CALCULATION(BAZETT): 426
R AXIS: 62
T WAVE AXIS: 9
VENTRICULAR RATE: 86

## 2020-08-12 ASSESSMENT — ENCOUNTER SYMPTOMS
DIFFICULTY URINATING: 0
FATIGUE: 0
COUGH: 0
BLOOD IN STOOL: 0
SORE THROAT: 0
TROUBLE SWALLOWING: 0
CONSTIPATION: 0
FLANK PAIN: 0
EYE REDNESS: 0
MYALGIAS: 1
HEADACHES: 0
DIZZINESS: 0
COLOR CHANGE: 0
NAUSEA: 1
FEVER: 1
EYE PAIN: 0
NUMBNESS: 0
CONFUSION: 0
APPETITE CHANGE: 1
SHORTNESS OF BREATH: 0
NECK PAIN: 0
BACK PAIN: 0
DYSURIA: 0
ABDOMINAL PAIN: 1
DIARRHEA: 0
VOMITING: 0
WEAKNESS: 0

## 2020-08-13 LAB — SARS-COV-2 RNA RESP QL NAA+PROBE: NOT DETECTED

## 2020-08-16 LAB
BACTERIA BLD CULT: NORMAL
BACTERIA BLD CULT: NORMAL

## 2020-12-22 ENCOUNTER — IMPORTED ENCOUNTER (OUTPATIENT)
Dept: URBAN - METROPOLITAN AREA CLINIC 9 | Facility: CLINIC | Age: 55
End: 2020-12-22

## 2021-05-21 ENCOUNTER — APPOINTMENT (EMERGENCY)
Dept: RADIOLOGY | Facility: HOSPITAL | Age: 56
End: 2021-05-21
Attending: EMERGENCY MEDICINE
Payer: COMMERCIAL

## 2021-05-21 ENCOUNTER — HOSPITAL ENCOUNTER (EMERGENCY)
Facility: HOSPITAL | Age: 56
Discharge: HOME | End: 2021-05-21
Attending: EMERGENCY MEDICINE
Payer: COMMERCIAL

## 2021-05-21 VITALS
RESPIRATION RATE: 16 BRPM | DIASTOLIC BLOOD PRESSURE: 85 MMHG | SYSTOLIC BLOOD PRESSURE: 124 MMHG | OXYGEN SATURATION: 98 % | WEIGHT: 240 LBS | HEIGHT: 74 IN | TEMPERATURE: 97.8 F | BODY MASS INDEX: 30.8 KG/M2 | HEART RATE: 70 BPM

## 2021-05-21 DIAGNOSIS — N20.0 KIDNEY STONE: ICD-10-CM

## 2021-05-21 DIAGNOSIS — N23 RENAL COLIC ON RIGHT SIDE: Primary | ICD-10-CM

## 2021-05-21 LAB
ALBUMIN SERPL-MCNC: 4.2 G/DL (ref 3.4–5)
ALP SERPL-CCNC: 63 IU/L (ref 35–126)
ALT SERPL-CCNC: 56 IU/L (ref 16–63)
ANION GAP SERPL CALC-SCNC: 10 MEQ/L (ref 3–15)
AST SERPL-CCNC: 34 IU/L (ref 15–41)
BACTERIA #/AREA URNS HPF: 1 /HPF
BASOPHILS # BLD: 0.09 K/UL (ref 0.01–0.1)
BASOPHILS NFR BLD: 0.8 %
BILIRUB SERPL-MCNC: 1.3 MG/DL (ref 0.3–1.2)
BILIRUB UR QL STRIP.AUTO: ABNORMAL MG/DL
BUN SERPL-MCNC: 24 MG/DL (ref 8–20)
CALCIUM SERPL-MCNC: 9 MG/DL (ref 8.9–10.3)
CAOX CRY URNS QL MICRO: ABNORMAL /HPF
CHLORIDE SERPL-SCNC: 102 MEQ/L (ref 98–109)
CLARITY UR REFRACT.AUTO: ABNORMAL
CO2 SERPL-SCNC: 24 MEQ/L (ref 22–32)
COLOR UR AUTO: ABNORMAL
CREAT SERPL-MCNC: 1.2 MG/DL (ref 0.8–1.3)
DIFFERENTIAL METHOD BLD: ABNORMAL
EOSINOPHIL # BLD: 0.42 K/UL (ref 0.04–0.54)
EOSINOPHIL NFR BLD: 3.7 %
ERYTHROCYTE [DISTWIDTH] IN BLOOD BY AUTOMATED COUNT: 13.5 % (ref 11.6–14.4)
GFR SERPL CREATININE-BSD FRML MDRD: >60 ML/MIN/1.73M*2
GLUCOSE SERPL-MCNC: 173 MG/DL (ref 70–99)
GLUCOSE UR STRIP.AUTO-MCNC: ABNORMAL MG/DL
HCT VFR BLDCO AUTO: 47.9 % (ref 40.1–51)
HGB BLD-MCNC: 15.8 G/DL (ref 13.7–17.5)
HGB UR QL STRIP.AUTO: 3
HYALINE CASTS #/AREA URNS LPF: ABNORMAL /LPF
IMM GRANULOCYTES # BLD AUTO: 0.04 K/UL (ref 0–0.08)
IMM GRANULOCYTES NFR BLD AUTO: 0.4 %
KETONES UR STRIP.AUTO-MCNC: ABNORMAL MG/DL
LEUKOCYTE ESTERASE UR QL STRIP.AUTO: 1
LYMPHOCYTES # BLD: 4.12 K/UL (ref 1.2–3.5)
LYMPHOCYTES NFR BLD: 36.3 %
MCH RBC QN AUTO: 28.6 PG (ref 28–33.2)
MCHC RBC AUTO-ENTMCNC: 33 G/DL (ref 32.2–36.5)
MCV RBC AUTO: 86.6 FL (ref 83–98)
MONOCYTES # BLD: 0.97 K/UL (ref 0.3–1)
MONOCYTES NFR BLD: 8.5 %
MUCOUS THREADS URNS QL MICRO: 1 /LPF
NEUTROPHILS # BLD: 5.71 K/UL (ref 1.7–7)
NEUTS SEG NFR BLD: 50.3 %
NITRITE UR QL STRIP.AUTO: ABNORMAL
NRBC BLD-RTO: 0 %
PDW BLD AUTO: 9.6 FL (ref 9.4–12.4)
PH UR STRIP.AUTO: ABNORMAL [PH]
PLATELET # BLD AUTO: 326 K/UL (ref 150–350)
POTASSIUM SERPL-SCNC: 3.9 MEQ/L (ref 3.6–5.1)
PROT SERPL-MCNC: 7.2 G/DL (ref 6–8.2)
PROT UR QL STRIP.AUTO: ABNORMAL
RBC # BLD AUTO: 5.53 M/UL (ref 4.5–5.8)
RBC #/AREA URNS HPF: ABNORMAL /HPF
SODIUM SERPL-SCNC: 136 MEQ/L (ref 136–144)
SP GR UR REFRACT.AUTO: >1.035
SQUAMOUS URNS QL MICRO: 1 /HPF
UROBILINOGEN UR STRIP-ACNC: ABNORMAL EU/DL
WBC # BLD AUTO: 11.35 K/UL (ref 3.8–10.5)
WBC #/AREA URNS HPF: ABNORMAL /HPF

## 2021-05-21 PROCEDURE — 85025 COMPLETE CBC W/AUTO DIFF WBC: CPT | Performed by: EMERGENCY MEDICINE

## 2021-05-21 PROCEDURE — 99284 EMERGENCY DEPT VISIT MOD MDM: CPT

## 2021-05-21 PROCEDURE — 25800000 HC PHARMACY IV SOLUTIONS: Performed by: PHYSICIAN ASSISTANT

## 2021-05-21 PROCEDURE — 74176 CT ABD & PELVIS W/O CONTRAST: CPT | Mod: ME

## 2021-05-21 PROCEDURE — 3E0337Z INTRODUCTION OF ELECTROLYTIC AND WATER BALANCE SUBSTANCE INTO PERIPHERAL VEIN, PERCUTANEOUS APPROACH: ICD-10-PCS | Performed by: EMERGENCY MEDICINE

## 2021-05-21 PROCEDURE — 81001 URINALYSIS AUTO W/SCOPE: CPT | Performed by: EMERGENCY MEDICINE

## 2021-05-21 PROCEDURE — 63600000 HC DRUGS/DETAIL CODE: Performed by: PHYSICIAN ASSISTANT

## 2021-05-21 PROCEDURE — 63700000 HC SELF-ADMINISTRABLE DRUG: Performed by: PHYSICIAN ASSISTANT

## 2021-05-21 PROCEDURE — 3E033GC INTRODUCTION OF OTHER THERAPEUTIC SUBSTANCE INTO PERIPHERAL VEIN, PERCUTANEOUS APPROACH: ICD-10-PCS | Performed by: EMERGENCY MEDICINE

## 2021-05-21 PROCEDURE — 3E0333Z INTRODUCTION OF ANTI-INFLAMMATORY INTO PERIPHERAL VEIN, PERCUTANEOUS APPROACH: ICD-10-PCS | Performed by: EMERGENCY MEDICINE

## 2021-05-21 PROCEDURE — 96375 TX/PRO/DX INJ NEW DRUG ADDON: CPT

## 2021-05-21 PROCEDURE — 3E033NZ INTRODUCTION OF ANALGESICS, HYPNOTICS, SEDATIVES INTO PERIPHERAL VEIN, PERCUTANEOUS APPROACH: ICD-10-PCS | Performed by: EMERGENCY MEDICINE

## 2021-05-21 PROCEDURE — 96361 HYDRATE IV INFUSION ADD-ON: CPT

## 2021-05-21 PROCEDURE — 80053 COMPREHEN METABOLIC PANEL: CPT | Performed by: EMERGENCY MEDICINE

## 2021-05-21 PROCEDURE — 96376 TX/PRO/DX INJ SAME DRUG ADON: CPT

## 2021-05-21 PROCEDURE — 96374 THER/PROPH/DIAG INJ IV PUSH: CPT

## 2021-05-21 PROCEDURE — 87086 URINE CULTURE/COLONY COUNT: CPT | Performed by: EMERGENCY MEDICINE

## 2021-05-21 PROCEDURE — 63600000 HC DRUGS/DETAIL CODE: Performed by: EMERGENCY MEDICINE

## 2021-05-21 PROCEDURE — 36415 COLL VENOUS BLD VENIPUNCTURE: CPT | Performed by: EMERGENCY MEDICINE

## 2021-05-21 RX ORDER — OXYCODONE AND ACETAMINOPHEN 5; 325 MG/1; MG/1
1 TABLET ORAL ONCE
Status: COMPLETED | OUTPATIENT
Start: 2021-05-21 | End: 2021-05-21

## 2021-05-21 RX ORDER — TAMSULOSIN HYDROCHLORIDE 0.4 MG/1
0.4 CAPSULE ORAL NIGHTLY
Qty: 10 CAPSULE | Refills: 0 | Status: SHIPPED | OUTPATIENT
Start: 2021-05-21 | End: 2021-07-10

## 2021-05-21 RX ORDER — ONDANSETRON HYDROCHLORIDE 2 MG/ML
INJECTION, SOLUTION INTRAVENOUS
Status: DISCONTINUED
Start: 2021-05-21 | End: 2021-05-21 | Stop reason: HOSPADM

## 2021-05-21 RX ORDER — MORPHINE SULFATE 4 MG/ML
4 INJECTION, SOLUTION INTRAMUSCULAR; INTRAVENOUS ONCE
Status: COMPLETED | OUTPATIENT
Start: 2021-05-21 | End: 2021-05-21

## 2021-05-21 RX ORDER — TRAMADOL HYDROCHLORIDE 50 MG/1
TABLET ORAL
COMMUNITY
End: 2021-07-10

## 2021-05-21 RX ORDER — KETOROLAC TROMETHAMINE 30 MG/ML
30 INJECTION, SOLUTION INTRAMUSCULAR; INTRAVENOUS ONCE
Status: COMPLETED | OUTPATIENT
Start: 2021-05-21 | End: 2021-05-21

## 2021-05-21 RX ORDER — ONDANSETRON HYDROCHLORIDE 2 MG/ML
4 INJECTION, SOLUTION INTRAVENOUS ONCE
Status: COMPLETED | OUTPATIENT
Start: 2021-05-21 | End: 2021-05-21

## 2021-05-21 RX ORDER — OXYCODONE AND ACETAMINOPHEN 5; 325 MG/1; MG/1
1 TABLET ORAL EVERY 6 HOURS PRN
Qty: 8 TABLET | Refills: 0 | Status: SHIPPED | OUTPATIENT
Start: 2021-05-21 | End: 2021-05-23

## 2021-05-21 RX ADMIN — SODIUM CHLORIDE 1000 ML: 9 INJECTION, SOLUTION INTRAVENOUS at 01:23

## 2021-05-21 RX ADMIN — ONDANSETRON HYDROCHLORIDE 4 MG: 2 SOLUTION INTRAMUSCULAR; INTRAVENOUS at 01:20

## 2021-05-21 RX ADMIN — KETOROLAC TROMETHAMINE 30 MG: 30 INJECTION, SOLUTION INTRAMUSCULAR; INTRAVENOUS at 01:20

## 2021-05-21 RX ADMIN — MORPHINE SULFATE 4 MG: 4 INJECTION, SOLUTION INTRAMUSCULAR; INTRAVENOUS at 03:43

## 2021-05-21 RX ADMIN — OXYCODONE HYDROCHLORIDE AND ACETAMINOPHEN 1 TABLET: 5; 325 TABLET ORAL at 05:23

## 2021-05-21 RX ADMIN — MORPHINE SULFATE 4 MG: 4 INJECTION, SOLUTION INTRAMUSCULAR; INTRAVENOUS at 02:01

## 2021-05-21 NOTE — ED PROVIDER NOTES
Patient is a 55-year-old male with a past medical history of atrial fibrillation, hypertension and kidney stones sent to the ED for right-sided flank pain.  Patient stating his pain feels similar to prior stones.  Patient follows with Dr. Garcia previously.  States he has had lithotripsy previously for prior stones.  Patient denies fevers, chills nausea vomiting or obvious blood in his urine.      History provided by:  Patient and medical records   used: No    Flank Pain  Severity:  Mild  Onset quality:  Gradual  Timing:  Constant  Chronicity:  Recurrent  Associated symptoms: no abdominal pain, no chest pain, no cough, no diarrhea, no fever, no headaches, no nausea, no shortness of breath, no sore throat, no vomiting and no wheezing        Past Medical History:   Diagnosis Date   • Atrial fibrillation (CMS/HCC)    • Hypertension    • Kidney stone    • Lipid disorder        Past Surgical History:   Procedure Laterality Date   • CARDIAC ELECTROPHYSIOLOGY MAPPING AND ABLATION     • LITHOTRIPSY         Allergies   Allergen Reactions   • No Known Drug Allergies        Social History     Tobacco Use   Smoking Status Never Smoker   Smokeless Tobacco Never Used       Social History     Substance and Sexual Activity   Alcohol Use Yes       Social History     Substance and Sexual Activity   Drug Use No       No LMP for male patient.    Review of Systems   Constitutional: Negative for activity change, diaphoresis and fever.   HENT: Negative for facial swelling and sore throat.    Eyes: Negative for visual disturbance.   Respiratory: Negative for cough, shortness of breath and wheezing.    Cardiovascular: Negative for chest pain and leg swelling.   Gastrointestinal: Negative for abdominal pain, diarrhea, nausea and vomiting.   Genitourinary: Positive for flank pain. Negative for difficulty urinating and hematuria.   Musculoskeletal: Negative for back pain and neck pain.   Skin: Negative for color change  "and pallor.   Neurological: Negative for weakness and headaches.   Psychiatric/Behavioral: Negative for agitation and behavioral problems.       Vitals:    05/21/21 0059   BP: (!) 140/101   BP Location: Left upper arm   Patient Position: Sitting   Pulse: 71   Resp: 20   Temp: 36.7 °C (98.1 °F)   TempSrc: Temporal   SpO2: 98%   Weight: 109 kg (240 lb)   Height: 1.88 m (6' 2\")       Physical Exam  Vitals and nursing note reviewed.   Constitutional:       General: He is in acute distress.      Appearance: He is well-developed.   HENT:      Head: Normocephalic and atraumatic.   Eyes:      Conjunctiva/sclera: Conjunctivae normal.   Cardiovascular:      Rate and Rhythm: Normal rate and regular rhythm.   Pulmonary:      Effort: Pulmonary effort is normal.      Breath sounds: Normal breath sounds.   Abdominal:      General: There is no distension.      Palpations: Abdomen is soft. There is no mass.      Tenderness: There is no abdominal tenderness. There is right CVA tenderness.   Musculoskeletal:         General: No tenderness or deformity. Normal range of motion.      Cervical back: Normal range of motion.   Skin:     General: Skin is warm and dry.   Neurological:      General: No focal deficit present.      Mental Status: He is alert. Mental status is at baseline.         Procedures    ED Course as of May 26 2341   Fri May 21, 2021   0140 Impression: Right flank pain, history of kidney stones  Plan: Labs, CT, IV fluids, Toradol      [KL]   0222 RBC, Urine(!): Too Numerous To Count [KL]   0222 Blood, Urine(!): +3 [KL]   0222 WBC(!): 11.35 [KL]   0222 eGFR: >60.0 [KL]   0955 Call from radiology - CT abd/pelvis visualized RLL pulmonary nodule that has enlarged since previous CT, needs dedicated chest CT. I left message for patient to call for results.    [KK]   1033 Pt returned call and was notified of enlarging pulmonary nodule. He has a pcp and will follow up with them for chest CT.    [KK]   Wed May 26, 2021   2341 Labs " unremarkable.  CT results reviewed showing a 2 mm stone.  Patient discharged pain medication Flomax advised to strain his urine and follow-up with urology.    [KL]      ED Course User Index  [KK] Sabine Garza PA C  [KL] Evonne Mitchell PA C         Clinical Impressions as of May 26 2341   Renal colic on right side   Kidney stone       Final diagnoses:   None       Labs Reviewed   CBC AND DIFF   COMPREHENSIVE METABOLIC PANEL   URINALYSIS REFLEX CULTURE (ED AND OUTPATIENT ONLY)    Narrative:     The following orders were created for panel order UA with reflex culture.  Procedure                               Abnormality         Status                     ---------                               -----------         ------                     UA Reflex to Culture (Ma...[[id number]]                                                   Please view results for these tests on the individual orders.   RAINBOW DRAW PANEL    Narrative:     The following orders were created for panel order [place] Draw Panel.  Procedure                               Abnormality         Status                     ---------                               -----------         ------                     RAINBOW RED[[id number]]                                                                 RAINBOW LT BLUE[[id number]]                                                             RAINBOW LT GREEN[[id number]]                                                            RAINBOW GOLD[[id number]]                                                                  Please view results for these tests on the individual orders.   UA REFLEX CULTURE (MACROSCOPIC)   RAINBOW RED   RAINBOW LT BLUE   RAINBOW LT GREEN   RAINBOW GOLD       CT ABDOMEN PELVIS WITHOUT IV CONTRAST    (Results Pending)          Evonne Mitchell PA C  05/26/21 2341

## 2021-05-21 NOTE — DISCHARGE INSTRUCTIONS
You were evaluated for right flank pain.  You have a 3 mm kidney stone.  Please stay well-hydrated.  Please take medication as prescribed.  Please strain your urine and attempt to collect stone.  Please follow-up with urology as needed.  Please return to the ED for new or concerning symptoms

## 2021-05-21 NOTE — ED ATTESTATION NOTE
I have personally seen and examined the patient.  I reviewed and agree with pa/np/resident's assessment and plan of care, with the following exceptions: None  My examination, assessment, and plan of care of the pt is as follows:    C/o right flank pain starting around 11pm. Assoc with n/v no fever or dysuria. H/o kidney stones and feels the same.       PE - NAD  NCAT  Cards - rrr no m/r/g  Lungs - cta bilat, symmetric, nonlabored  abd - soft nontender.   Ext - no deformity or ttp.   Neuro - nonfocal          ED Triage Vitals   Temp Heart Rate Resp BP SpO2   05/21/21 0059 05/21/21 0059 05/21/21 0059 05/21/21 0059 05/21/21 0059   36.7 °C (98.1 °F) 71 20 (!) 140/101 98 %      Temp Source Heart Rate Source Patient Position BP Location FiO2 (%) (Set)   05/21/21 0059 05/21/21 0200 05/21/21 0059 05/21/21 0059 --   Temporal Monitor Sitting Left upper arm          Vitals reviewed and pulse ox - 98 %- not hypoxic.      A/P - likely kidney stone. Will check labs and ct. tx pain medicine.       Labs Reviewed   CBC AND DIFF - Abnormal       Result Value    WBC 11.35 (*)     RBC 5.53      Hemoglobin 15.8      Hematocrit 47.9      MCV 86.6      MCH 28.6      MCHC 33.0      RDW 13.5      Platelets 326      MPV 9.6      Differential Type Auto      nRBC 0.0      Immature Granulocytes 0.4      Neutrophils 50.3      Lymphocytes 36.3      Monocytes 8.5      Eosinophils 3.7      Basophils 0.8      Immature Granulocytes, Absolute 0.04      Neutrophils, Absolute 5.71      Lymphocytes, Absolute 4.12 (*)     Monocytes, Absolute 0.97      Eosinophils, Absolute 0.42      Basophils, Absolute 0.09     COMPREHENSIVE METABOLIC PANEL - Abnormal    Sodium 136      Potassium 3.9      Chloride 102      CO2 24      BUN 24 (*)     Creatinine 1.2      Glucose 173 (*)     Calcium 9.0      AST (SGOT) 34      ALT (SGPT) 56      Alkaline Phosphatase 63      Total Protein 7.2      Albumin 4.2      Bilirubin, Total 1.3 (*)     eGFR >60.0      Anion Gap 10      UA REFLEX CULTURE (MACROSCOPIC) - Abnormal    Color, Urine Orange (*)     Clarity, Urine Turbid (*)     Specific Gravity, Urine >1.035 (*)     pH, Urine   (*)     Value: Proper identification not possible due to color interference.    Leukocyte Esterase +1 (*)     Nitrite, Urine   (*)     Value: Proper identification not possible due to color interference.    Protein, Urine   (*)     Value: Proper identification not possible due to color interference.    Glucose, Urine   (*)     Value: Proper identification not possible due to color interference.    Ketones, Urine   (*)     Value: Proper identification not possible due to color interference.    Urobilinogen, Urine   (*)     Value: Proper identification not possible due to color interference.    Bilirubin, Urine   (*)     Value: Proper identification not possible due to color interference.    Blood, Urine +3 (*)    UA MICROSCOPIC - Abnormal    WBC, Urine 4 TO 10 (*)     Squamous Epithelial +1 (*)     RBC, Urine Too Numerous To Count (*)     Hyaline Casts 0 TO 2 (*)     Bacteria, Urine +1 (*)     Calcium Oxalate Crystals Rare (*)     MUCUSUA +1 (*)    URINE CULTURE   URINALYSIS REFLEX CULTURE (ED AND OUTPATIENT ONLY)    Narrative:     The following orders were created for panel order UA with reflex culture.  Procedure                               Abnormality         Status                     ---------                               -----------         ------                     UA Reflex to Culture (Ma...[[id number]]  Abnormal            Final result               UA Microscopic[[id number]]               Abnormal            Final result                 Please view results for these tests on the individual orders.   RAINBOW DRAW PANEL    Narrative:     The following orders were created for panel order [place] Draw Panel.  Procedure                               Abnormality         Status                     ---------                               -----------          ------                     RAINBOW RED[[id number]]                                      In process                 RAINBOW LT BLUE[[id number]]                                  In process                 RAINBOW LT GREEN[[id number]]                                 In process                 RAINBOW GOLD[[id number]]                                     In process                   Please view results for these tests on the individual orders.   RAINBOW RED   RAINBOW LT BLUE   RAINBOW LT GREEN   RAINBOW GOLD       CT ABDOMEN PELVIS WITHOUT IV CONTRAST    (Results Pending)       ED Course as of May 21 0323   Fri May 21, 2021   0222 RBC, Urine(!): Too Numerous To Count [KL]   0222 Blood, Urine(!): +3 [KL]   0222 WBC(!): 11.35 [KL]   0222 eGFR: >60.0 [KL]   0222 Creatinine: 1.2 [KL]      ED Course User Index  [KL] Evonne Mitchell PA C Kleckner, Kenneth, MD  05/21/21 4466

## 2021-05-22 LAB — BACTERIA UR CULT: NORMAL

## 2021-05-26 ASSESSMENT — ENCOUNTER SYMPTOMS
AGITATION: 0
DIFFICULTY URINATING: 0
HEADACHES: 0
SORE THROAT: 0
VOMITING: 0
BACK PAIN: 0
WEAKNESS: 0
COLOR CHANGE: 0
HEMATURIA: 0
FEVER: 0
SHORTNESS OF BREATH: 0
DIARRHEA: 0
COUGH: 0
WHEEZING: 0
FACIAL SWELLING: 0
NAUSEA: 0
NECK PAIN: 0
ACTIVITY CHANGE: 0
FLANK PAIN: 1
ABDOMINAL PAIN: 0
DIAPHORESIS: 0

## 2021-07-07 ENCOUNTER — TRANSCRIBE ORDERS (OUTPATIENT)
Dept: SCHEDULING | Age: 56
End: 2021-07-07

## 2021-07-07 DIAGNOSIS — R91.1 SOLITARY PULMONARY NODULE: Primary | ICD-10-CM

## 2021-07-10 ENCOUNTER — APPOINTMENT (EMERGENCY)
Dept: RADIOLOGY | Facility: HOSPITAL | Age: 56
End: 2021-07-10
Payer: COMMERCIAL

## 2021-07-10 ENCOUNTER — HOSPITAL ENCOUNTER (EMERGENCY)
Facility: HOSPITAL | Age: 56
Discharge: HOME | End: 2021-07-10
Attending: EMERGENCY MEDICINE | Admitting: EMERGENCY MEDICINE
Payer: COMMERCIAL

## 2021-07-10 VITALS
HEIGHT: 74 IN | WEIGHT: 245 LBS | TEMPERATURE: 98.2 F | DIASTOLIC BLOOD PRESSURE: 76 MMHG | SYSTOLIC BLOOD PRESSURE: 125 MMHG | BODY MASS INDEX: 31.44 KG/M2 | OXYGEN SATURATION: 98 % | RESPIRATION RATE: 18 BRPM | HEART RATE: 74 BPM

## 2021-07-10 DIAGNOSIS — R07.9 CHEST PAIN, UNSPECIFIED TYPE: Primary | ICD-10-CM

## 2021-07-10 LAB
ALBUMIN SERPL-MCNC: 4.6 G/DL (ref 3.4–5)
ALP SERPL-CCNC: 61 IU/L (ref 35–126)
ALT SERPL-CCNC: 58 IU/L (ref 16–63)
ANION GAP SERPL CALC-SCNC: 9 MEQ/L (ref 3–15)
AST SERPL-CCNC: 36 IU/L (ref 15–41)
BASOPHILS # BLD: 0.08 K/UL (ref 0.01–0.1)
BASOPHILS NFR BLD: 0.9 %
BILIRUB SERPL-MCNC: 1.2 MG/DL (ref 0.3–1.2)
BUN SERPL-MCNC: 24 MG/DL (ref 8–20)
CALCIUM SERPL-MCNC: 8.9 MG/DL (ref 8.9–10.3)
CHLORIDE SERPL-SCNC: 105 MEQ/L (ref 98–109)
CO2 SERPL-SCNC: 25 MEQ/L (ref 22–32)
CREAT SERPL-MCNC: 1.1 MG/DL (ref 0.8–1.3)
D DIMER PPP IA.FEU-MCNC: <0.27 UG/ML FEU (ref 0–0.5)
DIFFERENTIAL METHOD BLD: NORMAL
EOSINOPHIL # BLD: 0.3 K/UL (ref 0.04–0.54)
EOSINOPHIL NFR BLD: 3.3 %
ERYTHROCYTE [DISTWIDTH] IN BLOOD BY AUTOMATED COUNT: 13.9 % (ref 11.6–14.4)
GFR SERPL CREATININE-BSD FRML MDRD: >60 ML/MIN/1.73M*2
GLUCOSE SERPL-MCNC: 144 MG/DL (ref 70–99)
HCT VFR BLDCO AUTO: 45.7 % (ref 40.1–51)
HGB BLD-MCNC: 15.4 G/DL (ref 13.7–17.5)
IMM GRANULOCYTES # BLD AUTO: 0.03 K/UL (ref 0–0.08)
IMM GRANULOCYTES NFR BLD AUTO: 0.3 %
LYMPHOCYTES # BLD: 2.84 K/UL (ref 1.2–3.5)
LYMPHOCYTES NFR BLD: 30.8 %
MCH RBC QN AUTO: 28.9 PG (ref 28–33.2)
MCHC RBC AUTO-ENTMCNC: 33.7 G/DL (ref 32.2–36.5)
MCV RBC AUTO: 85.9 FL (ref 83–98)
MONOCYTES # BLD: 0.63 K/UL (ref 0.3–1)
MONOCYTES NFR BLD: 6.8 %
NEUTROPHILS # BLD: 5.35 K/UL (ref 1.7–7)
NEUTS SEG NFR BLD: 57.9 %
NRBC BLD-RTO: 0 %
PDW BLD AUTO: 9.6 FL (ref 9.4–12.4)
PLATELET # BLD AUTO: 306 K/UL (ref 150–350)
POTASSIUM SERPL-SCNC: 3.7 MEQ/L (ref 3.6–5.1)
PROT SERPL-MCNC: 7.5 G/DL (ref 6–8.2)
RBC # BLD AUTO: 5.32 M/UL (ref 4.5–5.8)
SODIUM SERPL-SCNC: 139 MEQ/L (ref 136–144)
TROPONIN I SERPL-MCNC: <0.02 NG/ML
WBC # BLD AUTO: 9.23 K/UL (ref 3.8–10.5)

## 2021-07-10 PROCEDURE — 93005 ELECTROCARDIOGRAM TRACING: CPT | Performed by: PHYSICIAN ASSISTANT

## 2021-07-10 PROCEDURE — 85379 FIBRIN DEGRADATION QUANT: CPT | Performed by: PHYSICIAN ASSISTANT

## 2021-07-10 PROCEDURE — 36415 COLL VENOUS BLD VENIPUNCTURE: CPT | Performed by: PHYSICIAN ASSISTANT

## 2021-07-10 PROCEDURE — 84484 ASSAY OF TROPONIN QUANT: CPT | Performed by: PHYSICIAN ASSISTANT

## 2021-07-10 PROCEDURE — 71045 X-RAY EXAM CHEST 1 VIEW: CPT

## 2021-07-10 PROCEDURE — 85025 COMPLETE CBC W/AUTO DIFF WBC: CPT | Performed by: PHYSICIAN ASSISTANT

## 2021-07-10 PROCEDURE — 99283 EMERGENCY DEPT VISIT LOW MDM: CPT | Mod: 25

## 2021-07-10 PROCEDURE — 80053 COMPREHEN METABOLIC PANEL: CPT | Performed by: PHYSICIAN ASSISTANT

## 2021-07-10 ASSESSMENT — ENCOUNTER SYMPTOMS
HEMATURIA: 0
COLOR CHANGE: 0
CHILLS: 0
PALPITATIONS: 0
VOMITING: 0
DYSURIA: 0
FEVER: 0
EYE PAIN: 0
ARTHRALGIAS: 0
CONFUSION: 0
SHORTNESS OF BREATH: 0
SORE THROAT: 0
BACK PAIN: 0
SEIZURES: 0
COUGH: 0
ABDOMINAL PAIN: 0
DIAPHORESIS: 1
AGITATION: 0

## 2021-07-10 NOTE — DISCHARGE INSTRUCTIONS
Please follow up with cardiology on Monday. I have requested that the office schedule you an expedited appointment but you should call the office on Monday morning to schedule an appointment time.    We offered further testing in the ER today including repeat EKG and labs but you prefer to go home. If your symptoms return or you have any new symptoms you should return to any ER immediately.    Return to the ED for worsening of symptoms or any problems or concerns.  It is very important to follow up with your healthcare provider for re-evaluation.    You may have incidental findings on your lab work or radiology studies today. Please be sure to discuss your results with your primary care doctor. They may follow and perform additional testing/imaging if needed.

## 2021-07-10 NOTE — ED ATTESTATION NOTE
Procedures  Physical Exam  Review of Systems    7/10/28914:27 PM  I have personally seen and examined the patient. I have reviewed and agree with the PA/NP/Resident's assessment and ED plan of care. My examination, assessment and plan of care of Ino Carrion is as follows:    Patient is a 55-year-old male who presents with left-sided chest discomfort, patient reports he had 2 events where he felt like he was being punched in the chest, patient has no history of coronary artery disease, does have a history of atrial fibrillation and is status post an ablation    Exam:   Vital signs have been reviewed, pulse ox is 97% on room air, normal   Heart: RRR, normal S1/S2  Lungs: CTA bilaterally  Abdo: soft, non-tender    Plan: Patient is a 55-year-old male who presents with left-sided chest discomfort, EKG does not show an acute injury pattern, checking labs and imaging, reevaluate afterwards          Godshall, Duane K, MD  07/10/21 8330

## 2021-07-10 NOTE — ED PROVIDER NOTES
Emergency Medicine Note  HPI   HISTORY OF PRESENT ILLNESS     The patient is a 55 year old male with history of atrial fibrillation not on anticoagulation, HTN, hyperlipidemia, and nephrolithiasis who presents today for evaluation of chest pain. He reports pain started 45 minutes prior to arrival while he was sitting at his kitchen table prior to eating his lunch. He reports he felt 2 dull pounds to his left chest with associated dull pain. Symptoms lasted 20 minutes and resolved. He is currently pain free. He notes associated pleuritic pain in the same area without dyspnea. He had a brief episode of diaphoresis at onset but this resolved. He denies any nausea or vomiting. He has been playing golf in a tournament the past 2 days but denies any fatigue, chest pain, or dyspnea prior to today. He reports prior afib with ablation 4  Years ago. He has not had afib since his ablation. Cardiologist- Dr. Ramesh. This did not feel similar to prior afib. Family history of factor V Leiden mutation.       History provided by:  Patient   used: No    Chest Pain  Associated symptoms: diaphoresis    Associated symptoms: no abdominal pain, no back pain, no cough, no fever, no palpitations, no shortness of breath and no vomiting          Patient History   PAST HISTORY     Reviewed from Nursing Triage:      Past Medical History:   Diagnosis Date   • Atrial fibrillation (CMS/HCC)    • Hypertension    • Kidney stone    • Lipid disorder        Past Surgical History:   Procedure Laterality Date   • CARDIAC ELECTROPHYSIOLOGY MAPPING AND ABLATION     • LITHOTRIPSY         History reviewed. No pertinent family history.    Social History     Tobacco Use   • Smoking status: Never Smoker   • Smokeless tobacco: Never Used   Substance Use Topics   • Alcohol use: Yes   • Drug use: No         Review of Systems   REVIEW OF SYSTEMS     Review of Systems   Constitutional: Positive for diaphoresis. Negative for chills and fever.    HENT: Negative for ear pain and sore throat.    Eyes: Negative for pain and visual disturbance.   Respiratory: Negative for cough and shortness of breath.    Cardiovascular: Positive for chest pain. Negative for palpitations.   Gastrointestinal: Negative for abdominal pain and vomiting.   Endocrine: Negative for polyuria.   Genitourinary: Negative for dysuria and hematuria.   Musculoskeletal: Negative for arthralgias and back pain.   Skin: Negative for color change and rash.   Neurological: Negative for seizures and syncope.   Psychiatric/Behavioral: Negative for agitation and confusion.         VITALS     ED Vitals    Date/Time Temp Pulse Resp BP SpO2 [facility]   07/10/21 1642 -- 74 18 125/76 98 % KMG   07/10/21 1445 -- 78 21 124/83 96 % KK   07/10/21 1358 36.8 °C (98.2 °F) 82 20 133/88 97 % JEFF        Pulse Ox %: 98 % (07/10/21 1519)  Pulse Ox Interpretation: Normal (07/10/21 1519)  Heart Rate: 78 (07/10/21 1519)  Rhythm Strip Interpretation: Normal Sinus Rhythm (07/10/21 1519)     Physical Exam   PHYSICAL EXAM     Physical Exam  Vitals and nursing note reviewed.   Constitutional:       General: He is not in acute distress.     Appearance: Normal appearance. He is well-developed. He is not diaphoretic.   HENT:      Head: Normocephalic and atraumatic.   Eyes:      Conjunctiva/sclera: Conjunctivae normal.   Neck:      Trachea: Trachea normal.   Cardiovascular:      Rate and Rhythm: Normal rate and regular rhythm.      Heart sounds: Normal heart sounds. No murmur heard.     Pulmonary:      Effort: Pulmonary effort is normal. No respiratory distress.      Breath sounds: Normal breath sounds. No wheezing, rhonchi or rales.   Chest:      Chest wall: No tenderness.   Abdominal:      General: Bowel sounds are normal. There is no distension.      Palpations: Abdomen is soft. There is no mass.      Tenderness: There is no abdominal tenderness. There is no guarding or rebound.   Skin:     General: Skin is warm and dry.       Coloration: Skin is not pale.      Findings: No rash.   Neurological:      Mental Status: He is alert and oriented to person, place, and time.   Psychiatric:         Speech: Speech normal.         Behavior: Behavior normal. Behavior is cooperative.         Thought Content: Thought content normal.         Judgment: Judgment normal.           PROCEDURES     Procedures     DATA     Results     Procedure Component Value Units Date/Time    D-dimer, quantitative [[id number]]  (Normal) Collected: 07/10/21 1439    Specimen: Blood, Venous Updated: 07/10/21 1523     D-Dimer, Quant <0.27 ug/mL FEU      Comment: Suggested Age Related Reference Range: 0.00 - 0.54  The D-Dimer assay can be used as an aid in the diagnosis of DVT or PE. The test can not be used by itself to exclude DVT or PE. When used as a diagnostic aid, the cutoff value is the same as the reference range: <0.5 ug/ml FEU.       Troponin I [[id number]]  (Normal) Collected: 07/10/21 1406    Specimen: Blood, Venous Updated: 07/10/21 1438     Troponin I <0.02 ng/mL     Comprehensive metabolic panel [[id number]]  (Abnormal) Collected: 07/10/21 1406    Specimen: Blood, Venous Updated: 07/10/21 1434     Sodium 139 mEQ/L      Potassium 3.7 mEQ/L      Comment: Results obtained on plasma. Plasma Potassium values may be up to 0.4 mEQ/L less than serum values. The differences may be greater for patients with high platelet or white cell counts.        Chloride 105 mEQ/L      CO2 25 mEQ/L      BUN 24 mg/dL      Creatinine 1.1 mg/dL      Glucose 144 mg/dL      Calcium 8.9 mg/dL      AST (SGOT) 36 IU/L      ALT (SGPT) 58 IU/L      Alkaline Phosphatase 61 IU/L      Total Protein 7.5 g/dL      Comment: Test performed on plasma which typically contains approximately 0.4 g/dL more protein than serum.        Albumin 4.6 g/dL      Bilirubin, Total 1.2 mg/dL      eGFR >60.0 mL/min/1.73m*2      Anion Gap 9 mEQ/L     CBC and differential [[id number]] Collected: 07/10/21 1406    Specimen:  Blood, Venous Updated: 07/10/21 1415     WBC 9.23 K/uL      RBC 5.32 M/uL      Hemoglobin 15.4 g/dL      Hematocrit 45.7 %      MCV 85.9 fL      MCH 28.9 pg      MCHC 33.7 g/dL      RDW 13.9 %      Platelets 306 K/uL      MPV 9.6 fL      Differential Type Auto     nRBC 0.0 %      Immature Granulocytes 0.3 %      Neutrophils 57.9 %      Lymphocytes 30.8 %      Monocytes 6.8 %      Eosinophils 3.3 %      Basophils 0.9 %      Immature Granulocytes, Absolute 0.03 K/uL      Neutrophils, Absolute 5.35 K/uL      Lymphocytes, Absolute 2.84 K/uL      Monocytes, Absolute 0.63 K/uL      Eosinophils, Absolute 0.30 K/uL      Basophils, Absolute 0.08 K/uL     RAINBOW GOLD [[id number]] Collected: 07/10/21 1406    Specimen: Blood, Venous Updated: 07/10/21 1413    [place] Draw Panel [[id number]] Collected: 07/10/21 1406    Specimen: Blood, Venous Updated: 07/10/21 1412    Narrative:      The following orders were created for panel order [place] Draw Panel.  Procedure                               Abnormality         Status                     ---------                               -----------         ------                     RAINBOW RED[[id number]]                                      In process                 RAINBOW GOLD[[id number]]                                     In process                   Please view results for these tests on the individual orders.    RAINBOW RED [[id number]] Collected: 07/10/21 1406    Specimen: Blood, Venous Updated: 07/10/21 1412          Imaging Results          X-RAY CHEST 1 VIEW (Final result)  Result time 07/10/21 15:17:23    Final result                 Impression:    IMPRESSION:  Left lower lobe probably pleural-parenchymal atelectasis             Narrative:      CLINICAL HISTORY: chest pain    COMMENT: A single AP radiograph of the chest is obtained . Comparison is made to  prior exam of 8/11/2020. The cardiomediastinal silhouette is unremarkable.  Band  like focal atelectasis left lung base  There is no appreciable effusion.No  pneumothorax present. Pleural-parenchymal atelectasis left lung base..                                ECG 12 lead   ED Interpretation   Rhythm: [NSR]  Rate: 85  P waves: [normal interval]  QRS: [normal QRS]  Axis: [normal]  ST Segments: [no obvious ST elevation or ischemia]            Scoring tools                                 ED Course & MDM   MDM / ED COURSE and CLINICAL IMPRESSIONS     [facility]    ED Course as of Jul 10 1652   Sat Jul 10, 2021   1457 Troponin I: <0.02 [SL]   1552 D-Dimer, Quant: <0.27 [SL]   1630 Results discussed with patient. He remains symptom free. I recommended a repeat troponin and EKG to ensure no changes and a negative 4 hour troponin. We have discussed the utility of troponin multiple times during this visit. He is aware of limited usefulness of a single troponin and ekg at less than 4 hours. He still wishes to go home. He is alert, oriented and of sound mind to make decisions. He is agreeable to cardiology follow up. I will fax request for expedited appointment.     [SL]      ED Course User Index  [SL] Diamond Ha PA C         Clinical Impressions as of Jul 10 1652   Chest pain, unspecified type            Diamond Ha PA C  07/10/21 1652

## 2021-07-11 LAB
ATRIAL RATE: 85
P AXIS: 4
PR INTERVAL: 160
QRS DURATION: 80
QT INTERVAL: 358
QTC CALCULATION(BAZETT): 426
R AXIS: 53
T WAVE AXIS: 7
VENTRICULAR RATE: 85

## 2021-07-16 ENCOUNTER — HOSPITAL ENCOUNTER (OUTPATIENT)
Dept: RADIOLOGY | Facility: HOSPITAL | Age: 56
Discharge: HOME | End: 2021-07-16
Attending: INTERNAL MEDICINE
Payer: COMMERCIAL

## 2021-07-16 DIAGNOSIS — R91.1 SOLITARY PULMONARY NODULE: ICD-10-CM

## 2021-07-16 PROCEDURE — 71250 CT THORAX DX C-: CPT

## 2021-10-16 ASSESSMENT — VISUAL ACUITY
OD_SC: 20/25 - SN
OS_SC: 20/25 SN

## 2021-10-16 ASSESSMENT — TONOMETRY
OS_IOP_MMHG: 14
OD_IOP_MMHG: 14

## 2023-06-20 ENCOUNTER — HOSPITAL ENCOUNTER (EMERGENCY)
Facility: HOSPITAL | Age: 58
Discharge: HOME | End: 2023-06-20
Attending: EMERGENCY MEDICINE | Admitting: EMERGENCY MEDICINE
Payer: COMMERCIAL

## 2023-06-20 ENCOUNTER — APPOINTMENT (EMERGENCY)
Dept: RADIOLOGY | Facility: HOSPITAL | Age: 58
End: 2023-06-20
Payer: COMMERCIAL

## 2023-06-20 VITALS
TEMPERATURE: 98 F | BODY MASS INDEX: 28.36 KG/M2 | RESPIRATION RATE: 16 BRPM | WEIGHT: 221 LBS | SYSTOLIC BLOOD PRESSURE: 130 MMHG | OXYGEN SATURATION: 95 % | HEART RATE: 93 BPM | DIASTOLIC BLOOD PRESSURE: 83 MMHG | HEIGHT: 74 IN

## 2023-06-20 DIAGNOSIS — K57.92 DIVERTICULITIS: Primary | ICD-10-CM

## 2023-06-20 LAB
ALBUMIN SERPL-MCNC: 4.5 G/DL (ref 3.4–5)
ALP SERPL-CCNC: 48 IU/L (ref 35–126)
ALT SERPL-CCNC: 31 IU/L (ref 16–63)
ANION GAP SERPL CALC-SCNC: 9 MEQ/L (ref 3–15)
AST SERPL-CCNC: 24 IU/L (ref 15–41)
BASOPHILS # BLD: 0.06 K/UL (ref 0.01–0.1)
BASOPHILS NFR BLD: 0.5 %
BILIRUB SERPL-MCNC: 1.7 MG/DL (ref 0.3–1.2)
BUN SERPL-MCNC: 20 MG/DL (ref 8–20)
CALCIUM SERPL-MCNC: 9.4 MG/DL (ref 8.9–10.3)
CHLORIDE SERPL-SCNC: 104 MEQ/L (ref 98–109)
CO2 SERPL-SCNC: 25 MEQ/L (ref 22–32)
CREAT SERPL-MCNC: 0.9 MG/DL (ref 0.8–1.3)
DIFFERENTIAL METHOD BLD: ABNORMAL
EOSINOPHIL # BLD: 0.19 K/UL (ref 0.04–0.54)
EOSINOPHIL NFR BLD: 1.5 %
ERYTHROCYTE [DISTWIDTH] IN BLOOD BY AUTOMATED COUNT: 12.9 % (ref 11.6–14.4)
GFR SERPL CREATININE-BSD FRML MDRD: >60 ML/MIN/1.73M*2
GLUCOSE SERPL-MCNC: 174 MG/DL (ref 70–99)
HCT VFR BLDCO AUTO: 50.6 % (ref 40.1–51)
HGB BLD-MCNC: 17.1 G/DL (ref 13.7–17.5)
IMM GRANULOCYTES # BLD AUTO: 0.03 K/UL (ref 0–0.08)
IMM GRANULOCYTES NFR BLD AUTO: 0.2 %
LIPASE SERPL-CCNC: 30 U/L (ref 20–51)
LYMPHOCYTES # BLD: 2.31 K/UL (ref 1.2–3.5)
LYMPHOCYTES NFR BLD: 17.9 %
MCH RBC QN AUTO: 30.1 PG (ref 28–33.2)
MCHC RBC AUTO-ENTMCNC: 33.8 G/DL (ref 32.2–36.5)
MCV RBC AUTO: 89.1 FL (ref 83–98)
MONOCYTES # BLD: 0.65 K/UL (ref 0.3–1)
MONOCYTES NFR BLD: 5 %
NEUTROPHILS # BLD: 9.7 K/UL (ref 1.7–7)
NEUTS SEG NFR BLD: 74.9 %
NRBC BLD-RTO: 0 %
PDW BLD AUTO: 9.2 FL (ref 9.4–12.4)
PLATELET # BLD AUTO: 288 K/UL (ref 150–350)
POTASSIUM SERPL-SCNC: 4 MEQ/L (ref 3.6–5.1)
PROT SERPL-MCNC: 7.6 G/DL (ref 6–8.2)
RBC # BLD AUTO: 5.68 M/UL (ref 4.5–5.8)
SODIUM SERPL-SCNC: 138 MEQ/L (ref 136–144)
WBC # BLD AUTO: 12.94 K/UL (ref 3.8–10.5)

## 2023-06-20 PROCEDURE — 3E033GC INTRODUCTION OF OTHER THERAPEUTIC SUBSTANCE INTO PERIPHERAL VEIN, PERCUTANEOUS APPROACH: ICD-10-PCS | Performed by: EMERGENCY MEDICINE

## 2023-06-20 PROCEDURE — 85025 COMPLETE CBC W/AUTO DIFF WBC: CPT

## 2023-06-20 PROCEDURE — 63600000 HC DRUGS/DETAIL CODE: Mod: JZ | Performed by: STUDENT IN AN ORGANIZED HEALTH CARE EDUCATION/TRAINING PROGRAM

## 2023-06-20 PROCEDURE — 80053 COMPREHEN METABOLIC PANEL: CPT

## 2023-06-20 PROCEDURE — 85025 COMPLETE CBC W/AUTO DIFF WBC: CPT | Performed by: EMERGENCY MEDICINE

## 2023-06-20 PROCEDURE — 63600105 HC IODINE BASED CONTRAST: Mod: JZ | Performed by: STUDENT IN AN ORGANIZED HEALTH CARE EDUCATION/TRAINING PROGRAM

## 2023-06-20 PROCEDURE — 80053 COMPREHEN METABOLIC PANEL: CPT | Performed by: EMERGENCY MEDICINE

## 2023-06-20 PROCEDURE — 99284 EMERGENCY DEPT VISIT MOD MDM: CPT | Mod: 25

## 2023-06-20 PROCEDURE — 63700000 HC SELF-ADMINISTRABLE DRUG: Performed by: STUDENT IN AN ORGANIZED HEALTH CARE EDUCATION/TRAINING PROGRAM

## 2023-06-20 PROCEDURE — 83690 ASSAY OF LIPASE: CPT | Performed by: STUDENT IN AN ORGANIZED HEALTH CARE EDUCATION/TRAINING PROGRAM

## 2023-06-20 PROCEDURE — 36415 COLL VENOUS BLD VENIPUNCTURE: CPT

## 2023-06-20 PROCEDURE — 96374 THER/PROPH/DIAG INJ IV PUSH: CPT | Mod: XU

## 2023-06-20 PROCEDURE — G1004 CDSM NDSC: HCPCS

## 2023-06-20 RX ORDER — CEFUROXIME AXETIL 250 MG/1
500 TABLET ORAL ONCE
Status: COMPLETED | OUTPATIENT
Start: 2023-06-20 | End: 2023-06-20

## 2023-06-20 RX ORDER — KETOROLAC TROMETHAMINE 30 MG/ML
30 INJECTION, SOLUTION INTRAMUSCULAR; INTRAVENOUS ONCE
Status: COMPLETED | OUTPATIENT
Start: 2023-06-20 | End: 2023-06-20

## 2023-06-20 RX ORDER — CEFUROXIME AXETIL 500 MG/1
500 TABLET ORAL 2 TIMES DAILY
Qty: 14 TABLET | Refills: 0 | Status: SHIPPED | OUTPATIENT
Start: 2023-06-20 | End: 2023-06-30

## 2023-06-20 RX ORDER — METRONIDAZOLE 500 MG/1
500 TABLET ORAL ONCE
Status: COMPLETED | OUTPATIENT
Start: 2023-06-20 | End: 2023-06-20

## 2023-06-20 RX ORDER — METRONIDAZOLE 500 MG/1
500 TABLET ORAL 3 TIMES DAILY
Qty: 30 TABLET | Refills: 0 | Status: SHIPPED | OUTPATIENT
Start: 2023-06-20 | End: 2023-06-30

## 2023-06-20 RX ADMIN — METRONIDAZOLE 500 MG: 500 TABLET ORAL at 19:08

## 2023-06-20 RX ADMIN — CEFUROXIME AXETIL 500 MG: 250 TABLET ORAL at 19:08

## 2023-06-20 RX ADMIN — KETOROLAC TROMETHAMINE 30 MG: 30 INJECTION, SOLUTION INTRAMUSCULAR; INTRAVENOUS at 16:19

## 2023-06-20 RX ADMIN — IOHEXOL 100 ML: 350 INJECTION, SOLUTION INTRAVENOUS at 18:25

## 2023-06-20 ASSESSMENT — ENCOUNTER SYMPTOMS
APPETITE CHANGE: 0
CONSTIPATION: 0
SHORTNESS OF BREATH: 0
VOMITING: 0
DIARRHEA: 0
BACK PAIN: 0
DIFFICULTY URINATING: 0
MYALGIAS: 0
DIAPHORESIS: 0
NAUSEA: 0
HEADACHES: 0
LIGHT-HEADEDNESS: 0
FREQUENCY: 0
DIZZINESS: 0
ABDOMINAL PAIN: 1
FEVER: 0
WEAKNESS: 0

## 2023-06-20 NOTE — ED PROVIDER NOTES
Emergency Medicine Note  HPI   HISTORY OF PRESENT ILLNESS       History provided by:  Patient   used: No      58 y/o M with PMH of HTN, HLD, kidney stone, Afib s/p ablation presents to the ED for evaluation of abdominal pain.  Last night, patient was laying in bed when his 15 pound dog stepped on his lower abdomen, resulting in significant abdominal pain.  He was able to fall back asleep.  Today, patient was with persistent pain upon palpation.  Patient went to his PCPs office who was concerned for diverticulitis as patient had left lower quadrant abdominal tenderness with rebound and guarding.  Patient was sent to the ER for further evaluation.  He currently rates his pain 5/10 in severity.  He denies any medications for pain.  He denies any fevers, chills, appetite changes, nausea, vomiting, diarrhea, constipation, chest pain, true breathing, difficulty urine, urinary frequency or burning, back pain, dizziness, lightheadedness, rashes.  No recent illness or sick contacts.  No recent bad food exposure or travel.  Colonoscopy in 2021 revealed diverticulosis.  History of kidney stones but this feels different.    PCP Daniel Wynn        Patient History   PAST HISTORY     Reviewed from Nursing Triage:  Tobacco  Allergies  Meds  Problems  Med Hx  Surg Hx  Fam Hx  Soc   Hx      Past Medical History:   Diagnosis Date   • Atrial fibrillation (CMS/HCC)    • Hypertension    • Kidney stone    • Lipid disorder        Past Surgical History:   Procedure Laterality Date   • CARDIAC ELECTROPHYSIOLOGY MAPPING AND ABLATION     • LITHOTRIPSY         History reviewed. No pertinent family history.    Social History     Tobacco Use   • Smoking status: Never   • Smokeless tobacco: Never   Substance Use Topics   • Alcohol use: Yes   • Drug use: No         Review of Systems   REVIEW OF SYSTEMS     Review of Systems   Constitutional: Negative for appetite change, diaphoresis and fever.   Respiratory: Negative for  shortness of breath.    Cardiovascular: Negative for chest pain.   Gastrointestinal: Positive for abdominal pain. Negative for constipation, diarrhea, nausea and vomiting.   Genitourinary: Negative for difficulty urinating and frequency.   Musculoskeletal: Negative for back pain and myalgias.   Skin: Negative for rash.   Neurological: Negative for dizziness, weakness, light-headedness and headaches.         VITALS     ED Vitals    Date/Time Temp Pulse Resp BP SpO2 [facility]   06/20/23 1427 36.7 °C (98 °F) 93 16 130/83 95 % PB        Pulse Ox %: 95 % (06/20/23 1427)  Pulse Ox Interpretation: Normal (06/20/23 1427)  Heart Rate: 93 (06/20/23 1427)        Physical Exam   PHYSICAL EXAM     Physical Exam  Vitals and nursing note reviewed.   Constitutional:       General: He is not in acute distress.     Appearance: He is well-developed.   HENT:      Head: Normocephalic and atraumatic.      Nose: Nose normal.      Mouth/Throat:      Mouth: Mucous membranes are moist.   Eyes:      Conjunctiva/sclera: Conjunctivae normal.   Cardiovascular:      Rate and Rhythm: Normal rate and regular rhythm.      Heart sounds: Normal heart sounds.   Pulmonary:      Effort: Pulmonary effort is normal. No respiratory distress.      Breath sounds: Normal breath sounds.   Abdominal:      General: There is no distension.      Palpations: Abdomen is soft.      Tenderness: There is abdominal tenderness in the left lower quadrant. There is guarding and rebound. There is no right CVA tenderness or left CVA tenderness.   Musculoskeletal:         General: Normal range of motion.      Cervical back: Normal range of motion and neck supple.   Skin:     General: Skin is warm and dry.      Findings: No rash.   Neurological:      General: No focal deficit present.      Mental Status: He is alert and oriented to person, place, and time.           PROCEDURES     Procedures     DATA     Results     Procedure Component Value Units Date/Time    Lipase [[id number]]   (Normal) Collected: 06/20/23 1429    Specimen: Blood, Venous Updated: 06/20/23 1653     Lipase 30 U/L     Comprehensive metabolic panel [[id number]]  (Abnormal) Collected: 06/20/23 1429    Specimen: Blood, Venous Updated: 06/20/23 1500     Sodium 138 mEQ/L      Potassium 4.0 mEQ/L      Comment: Results obtained on plasma. Plasma Potassium values may be up to 0.4 mEQ/L less than serum values. The differences may be greater for patients with high platelet or white cell counts.        Chloride 104 mEQ/L      CO2 25 mEQ/L      BUN 20 mg/dL      Creatinine 0.9 mg/dL      Glucose 174 mg/dL      Calcium 9.4 mg/dL      AST (SGOT) 24 IU/L      ALT (SGPT) 31 IU/L      Alkaline Phosphatase 48 IU/L      Total Protein 7.6 g/dL      Comment: Test performed on plasma which typically contains approximately 0.4 g/dL more protein than serum.        Albumin 4.5 g/dL      Bilirubin, Total 1.7 mg/dL      eGFR >60.0 mL/min/1.73m*2      Anion Gap 9 mEQ/L     CBC and differential [[id number]]  (Abnormal) Collected: 06/20/23 1429    Specimen: Blood, Venous Updated: 06/20/23 1446     WBC 12.94 K/uL      RBC 5.68 M/uL      Hemoglobin 17.1 g/dL      Hematocrit 50.6 %      MCV 89.1 fL      MCH 30.1 pg      MCHC 33.8 g/dL      RDW 12.9 %      Platelets 288 K/uL      MPV 9.2 fL      Differential Type Auto     nRBC 0.0 %      Immature Granulocytes 0.2 %      Neutrophils 74.9 %      Lymphocytes 17.9 %      Monocytes 5.0 %      Eosinophils 1.5 %      Basophils 0.5 %      Immature Granulocytes, Absolute 0.03 K/uL      Neutrophils, Absolute 9.70 K/uL      Lymphocytes, Absolute 2.31 K/uL      Monocytes, Absolute 0.65 K/uL      Eosinophils, Absolute 0.19 K/uL      Basophils, Absolute 0.06 K/uL     RAINBOW RED [[id number]] Collected: 06/20/23 1429    Specimen: Blood, Venous Updated: 06/20/23 1439    RAINBOW LT BLUE [[id number]] Collected: 06/20/23 1429    Specimen: Blood, Venous Updated: 06/20/23 1439    [place] Draw Panel [[id number]] Collected: 06/20/23  1429    Specimen: Blood, Venous Updated: 06/20/23 1439    Narrative:      The following orders were created for panel order [place] Draw Panel.  Procedure                               Abnormality         Status                     ---------                               -----------         ------                     RAINBOW RED[[id number]]                                      In process                 RAINBOW LT BLUE[[id number]]                                  In process                 RAINBOW GOLD[[id number]]                                     In process                   Please view results for these tests on the individual orders.    RAINBOW GOLD [[id number]] Collected: 06/20/23 1429    Specimen: Blood, Venous Updated: 06/20/23 1439          Imaging Results          CT ABDOMEN PELVIS WITH IV CONTRAST (Final result)  Result time 06/20/23 18:49:27    Final result                 Impression:    IMPRESSION:  1. Findings compatible with acute diverticulitis at the junction of the  descending and sigmoid colon. No focal fluid collection or free air.  2. Additional incidental and stable findings as described.             Narrative:    CLINICAL HISTORY: LLQ abdominal pain.    COMMENT:    Comparison: CT abdomen pelvis 5/21/2021    Technique: CT of the Abdomen and Pelvis was performed with intravenous contrast.    Administered contrast and dose: 100mL of iohexoL (OMNIPAQUE 350) 350 mg  iodine/mL solution 100 mL  Delayed images of the abdomen were performed.  Sagittal and coronal reconstructions were obtained.  CT DOSE:  One or more dose reduction techniques (e.g. automated exposure  control, adjustment of the mA and/or kV according to patient size, use of  iterative reconstruction technique) was utilized for this examination.    Findings:  LOWER CHEST: Mild dependent hypoventilatory changes. There are a few small  pulmonary nodules in the right lower lobe measuring up to 6 mm, not  significantly changed compared to  5/21/2021. Left gynecomastia.    LIVER: There is a 1.3 cm cyst in the medial left hepatic lobe.  GALLBLADDER: No calcified gallstones. Normal caliber wall.  BILE DUCTS: Normal caliber.  PANCREAS: within normal limits  SPLEEN: There is an incompletely evaluated 1.3 cm hypoattenuating lesion in the  posterior spleen, which is incompletely characterized, however not significantly  changed compared to 5/21/2021.  ADRENALS: Stable nodularity of the left adrenal gland.  KIDNEYS: No hydronephrosis. There is a 1.3 cm cyst in the interpolar region of  the mid left kidney and 1.0 cm cyst in the lower pole of the left kidney.  Incidental note is made of a few additional renal lesions measuring less than  1.0 cm. These are too small to accurately characterize, however in the absence  of risk factors or history of malignancy, are likely of no clinical  significance.  URETERS: within normal limits  BLADDER: within normal limits  REPRODUCTIVE ORGANS: Enlarged prostate gland.    BOWEL: There is wall thickening and surrounding stranding at the junction of the  descending and sigmoid colon where there are multiple diverticula, compatible  with acute diverticulitis. No evidence of bowel obstruction. Normal appendix.  PERITONEUM/RETROPERITONEUM: No ascites, free air, or fluid collection.  LYMPH NODES: No enlarged mesenteric, retroperitoneal, iliac, or inguinal lymph  nodes.  VESSELS: Atherosclerotic changes.  The major mesenteric vessels are patent.  ABDOMINAL WALL: Small fat-containing umbilical and inguinal hernias.  BONES: Degenerative changes of the imaged spine.  Degenerative changes of the  sacroiliac and hip joints.                                No orders to display       Scoring tools                                  ED Course & MDM   MDM / ED COURSE / CLINICAL IMPRESSION / DISPO     MDM    ED Course as of 06/20/23 2029   Tue Jun 20, 2023   1532 Impression: LLQ abdominal pain x last night, sent by PCP for possible  diverticulitis.    Plan: labs, lipase, CT A/P, toradol   [EB]   1541 WBC(!): 12.94 [EB]   1542 CMP unremarkable. [EB]   1719 Lipase: 30  WNL [EB]   1759 Called radiology. They are backed up. Will try to bring patient back next. [EB]   1855 Ct A/PIMPRESSION:  1. Findings compatible with acute diverticulitis at the junction of the  descending and sigmoid colon. No focal fluid collection or free air.  2. Additional incidental and stable findings as described. [EB]   1858 Will discharge home with PCP follow-up, GI follow-up, Ceftin and Flagyl for 10 days, supportive care, strict return precautions. [EB]      ED Course User Index  [EB] Finoa Vásquez PA C     Clinical Impression      Diverticulitis     _________________     ED Disposition   Discharge                   Fiona Vásquez PA C  06/20/23 2029

## 2023-06-20 NOTE — ED ATTESTATION NOTE
Procedures      6/20/2023  4:28 PM  I have personally seen and examined the patient.  I personally performed the key components of the encounter and provided medical decision making for this patient. I reviewed and agree with the PA/NP/Resident's assessment and plan of care, with any exceptions as documented below.    My focused history, examination, assessment, and plan of care of Ino Carrion is as follows:    HPI: The patient is a 57 y.o. who comes to the ED for left lower quadrant abdominal pain.  Onset last night.  Sent by primary care doctor for concern for diverticulitis.  Known history of diverticula.  No nausea, vomiting.  Pain started yesterday when his small dog stepped on him.  Persisted today so came to get evaluated.        Pertinent past medical history includes: HTN, HL, A-fib, kidney stones, diverticular disease      Exam: Wake, alert, pleasant, no distress.  Converses easily.  Afebrile.  Normotensive.  Heart rate regular.  Ambulates easily  Vital Signs Review: Vital signs have been reviewed. The oxygen saturation is SpO2: 95 %  which is normal.          Impression/Plan/Medical decision making/ED course: Left lower quadrant abdominal pain.  Blood work with mildly elevated white blood cell count.  Remainder of blood work reassuring.  CT abdomen/pelvis with diverticulitis without abscess or free air.  First dose oral antibiotics given.  Patient advised not to drink when taking Flagyl.  Follow-up with primary care doctor.  Return to ER instructions reviewed         Imaging:  I independently reviewed imaging done in the ED as a wet read.    Disposition: Discharge            NOTE: Patient seen during a time of significantly increased volumes, decreased capacity and staff. Portion of management and initial evaluation may have been done while in the waiting room because of this. This document was created using dragon dictation software.  There might be some typographical errors due to this  technology.         Nicole Laurent MD  06/20/23 1914

## 2023-06-20 NOTE — DISCHARGE INSTRUCTIONS
You were seen in the ER for left lower quadrant abdominal pain.  Your blood work revealed a mildly elevated white blood cell count but otherwise negative for acute signs of anemia or electrolyte imbalance.  Your lipase which is a pancreatic enzyme was within normal limits.  CT of your abdomen pelvis revealed fndings compatible with acute diverticulitis at the junction of the descending and sigmoid colon. No focal fluid collection or free air.    It is recommended that you follow with your primary care physician and gastroenterology for further evaluation management.  Please call to make an appointment to be seen soon as possible.    You were started on 2 antibiotics.  Please take Ceftin 500 mg to be taken twice daily for 10 days and Flagyl to be taken 3 times daily for 10 days.  Please take medication as prescribed and take until complete.    You may take Motrin (600 mg) every 6 hours for pain. You may also take Tylenol (500 mg) every 6 hours for pain. You may alternate the two medications every 3 hours for complete pain control.     Please rest and drink plenty fluids stay hydrated keep your urine pale yellow.  Additionally please consume a clear liquid diet for the next 24 to 48 hours and proceed as tolerated.    Please return to the ER for any worsening abdominal pain, nausea, vomiting, diarrhea, constipation, fevers, chills, urinary symptoms, or any other new concerning symptoms.

## 2023-11-26 ENCOUNTER — HOSPITAL ENCOUNTER (EMERGENCY)
Facility: HOSPITAL | Age: 58
Discharge: HOME | End: 2023-11-26
Attending: EMERGENCY MEDICINE
Payer: COMMERCIAL

## 2023-11-26 VITALS
HEIGHT: 74 IN | HEART RATE: 95 BPM | DIASTOLIC BLOOD PRESSURE: 86 MMHG | WEIGHT: 205 LBS | RESPIRATION RATE: 16 BRPM | OXYGEN SATURATION: 98 % | TEMPERATURE: 98.7 F | SYSTOLIC BLOOD PRESSURE: 135 MMHG | BODY MASS INDEX: 26.31 KG/M2

## 2023-11-26 DIAGNOSIS — T78.40XA ALLERGIC REACTION TO DRUG, INITIAL ENCOUNTER: Primary | ICD-10-CM

## 2023-11-26 PROCEDURE — 99283 EMERGENCY DEPT VISIT LOW MDM: CPT

## 2023-11-26 PROCEDURE — 63700000 HC SELF-ADMINISTRABLE DRUG: Performed by: PHYSICIAN ASSISTANT

## 2023-11-26 RX ORDER — METHYLPREDNISOLONE 4 MG/1
4 TABLET ORAL SEE ADMIN INSTRUCTIONS
Qty: 1 TABLET | Refills: 0 | Status: SHIPPED | OUTPATIENT
Start: 2023-11-26 | End: 2024-02-26

## 2023-11-26 RX ORDER — PREDNISONE 20 MG/1
60 TABLET ORAL ONCE
Status: COMPLETED | OUTPATIENT
Start: 2023-11-26 | End: 2023-11-26

## 2023-11-26 RX ORDER — FAMOTIDINE 20 MG/1
20 TABLET, FILM COATED ORAL 2 TIMES DAILY
Qty: 20 TABLET | Refills: 0 | Status: SHIPPED | OUTPATIENT
Start: 2023-11-26 | End: 2024-02-26

## 2023-11-26 RX ORDER — HYDROCORTISONE 1 %
CREAM (GRAM) TOPICAL
Qty: 15 G | Refills: 0 | Status: SHIPPED | OUTPATIENT
Start: 2023-11-26 | End: 2024-02-26

## 2023-11-26 RX ORDER — FAMOTIDINE 20 MG/1
20 TABLET, FILM COATED ORAL ONCE
Status: COMPLETED | OUTPATIENT
Start: 2023-11-26 | End: 2023-11-26

## 2023-11-26 RX ADMIN — PREDNISONE 60 MG: 20 TABLET ORAL at 16:11

## 2023-11-26 RX ADMIN — FAMOTIDINE 20 MG: 20 TABLET ORAL at 16:14

## 2024-02-26 ENCOUNTER — HOSPITAL ENCOUNTER (INPATIENT)
Facility: HOSPITAL | Age: 59
LOS: 2 days | Discharge: HOME | DRG: 392 | End: 2024-02-28
Attending: EMERGENCY MEDICINE | Admitting: SURGERY
Payer: COMMERCIAL

## 2024-02-26 ENCOUNTER — APPOINTMENT (EMERGENCY)
Dept: RADIOLOGY | Facility: HOSPITAL | Age: 59
DRG: 392 | End: 2024-02-26
Attending: EMERGENCY MEDICINE
Payer: COMMERCIAL

## 2024-02-26 DIAGNOSIS — K57.20 DIVERTICULITIS OF COLON WITH PERFORATION: Primary | ICD-10-CM

## 2024-02-26 LAB
ABO + RH BLD: NORMAL
ALBUMIN SERPL-MCNC: 4.6 G/DL (ref 3.5–5.7)
ALP SERPL-CCNC: 50 IU/L (ref 34–125)
ALT SERPL-CCNC: 16 IU/L (ref 7–52)
ANION GAP SERPL CALC-SCNC: 8 MEQ/L (ref 3–15)
APTT PPP: 32 SEC (ref 23–35)
AST SERPL-CCNC: 11 IU/L (ref 13–39)
BACTERIA URNS QL MICRO: ABNORMAL /HPF
BASOPHILS # BLD: 0.04 K/UL (ref 0.01–0.1)
BASOPHILS NFR BLD: 0.3 %
BILIRUB SERPL-MCNC: 2.9 MG/DL (ref 0.3–1.2)
BILIRUB UR QL STRIP.AUTO: NEGATIVE MG/DL
BLD GP AB SCN SERPL QL: NEGATIVE
BUN SERPL-MCNC: 17 MG/DL (ref 7–25)
CALCIUM SERPL-MCNC: 9.6 MG/DL (ref 8.6–10.3)
CHLORIDE SERPL-SCNC: 103 MEQ/L (ref 98–107)
CLARITY UR REFRACT.AUTO: CLEAR
CO2 SERPL-SCNC: 27 MEQ/L (ref 21–31)
COLOR UR AUTO: ABNORMAL
CREAT SERPL-MCNC: 0.9 MG/DL (ref 0.7–1.3)
D AG BLD QL: POSITIVE
DIFFERENTIAL METHOD BLD: ABNORMAL
EGFRCR SERPLBLD CKD-EPI 2021: >60 ML/MIN/1.73M*2
EOSINOPHIL # BLD: 0.12 K/UL (ref 0.04–0.54)
EOSINOPHIL NFR BLD: 0.8 %
ERYTHROCYTE [DISTWIDTH] IN BLOOD BY AUTOMATED COUNT: 12.8 % (ref 11.6–14.4)
GLUCOSE SERPL-MCNC: 123 MG/DL (ref 70–99)
GLUCOSE UR STRIP.AUTO-MCNC: NEGATIVE MG/DL
HCT VFR BLD AUTO: 49.6 % (ref 40.1–51)
HGB BLD-MCNC: 17 G/DL (ref 13.7–17.5)
HGB UR QL STRIP.AUTO: ABNORMAL
HYALINE CASTS #/AREA URNS LPF: ABNORMAL /LPF
IMM GRANULOCYTES # BLD AUTO: 0.06 K/UL (ref 0–0.08)
IMM GRANULOCYTES NFR BLD AUTO: 0.4 %
INR PPP: 1.1
KETONES UR STRIP.AUTO-MCNC: NEGATIVE MG/DL
LABORATORY COMMENT REPORT: NORMAL
LABORATORY COMMENT REPORT: NORMAL
LACTATE SERPL-SCNC: 0.8 MMOL/L (ref 0.4–2)
LEUKOCYTE ESTERASE UR QL STRIP.AUTO: NEGATIVE
LIPASE SERPL-CCNC: 15 U/L (ref 11–82)
LYMPHOCYTES # BLD: 2.15 K/UL (ref 1.2–3.5)
LYMPHOCYTES NFR BLD: 14.2 %
MCH RBC QN AUTO: 30.3 PG (ref 28–33.2)
MCHC RBC AUTO-ENTMCNC: 34.3 G/DL (ref 32.2–36.5)
MCV RBC AUTO: 88.4 FL (ref 83–98)
MONOCYTES # BLD: 0.85 K/UL (ref 0.3–1)
MONOCYTES NFR BLD: 5.6 %
MUCOUS THREADS URNS QL MICRO: 1 /LPF
NEUTROPHILS # BLD: 11.88 K/UL (ref 1.7–7)
NEUTS SEG NFR BLD: 78.7 %
NITRITE UR QL STRIP.AUTO: NEGATIVE
NRBC BLD-RTO: 0 %
PDW BLD AUTO: 9.6 FL (ref 9.4–12.4)
PH UR STRIP.AUTO: 6 [PH]
PLATELET # BLD AUTO: 268 K/UL (ref 150–350)
POTASSIUM SERPL-SCNC: 3.6 MEQ/L (ref 3.5–5.1)
PROT SERPL-MCNC: 7.4 G/DL (ref 6–8.2)
PROT UR QL STRIP.AUTO: ABNORMAL
PROTHROMBIN TIME: 14.5 SEC (ref 12.2–14.5)
RBC # BLD AUTO: 5.61 M/UL (ref 4.5–5.8)
RBC #/AREA URNS HPF: ABNORMAL /HPF
SODIUM SERPL-SCNC: 138 MEQ/L (ref 136–145)
SP GR UR REFRACT.AUTO: 1.03
SPECIMEN EXP DATE BLD: NORMAL
SQUAMOUS URNS QL MICRO: ABNORMAL /HPF
UROBILINOGEN UR STRIP-ACNC: 2 EU/DL
WBC # BLD AUTO: 15.1 K/UL (ref 3.8–10.5)
WBC #/AREA URNS HPF: ABNORMAL /HPF

## 2024-02-26 PROCEDURE — 25800000 HC PHARMACY IV SOLUTIONS

## 2024-02-26 PROCEDURE — 36415 COLL VENOUS BLD VENIPUNCTURE: CPT

## 2024-02-26 PROCEDURE — 63600000 HC DRUGS/DETAIL CODE: Mod: JZ

## 2024-02-26 PROCEDURE — 85025 COMPLETE CBC W/AUTO DIFF WBC: CPT

## 2024-02-26 PROCEDURE — 81001 URINALYSIS AUTO W/SCOPE: CPT

## 2024-02-26 PROCEDURE — 96374 THER/PROPH/DIAG INJ IV PUSH: CPT

## 2024-02-26 PROCEDURE — 85610 PROTHROMBIN TIME: CPT

## 2024-02-26 PROCEDURE — 85025 COMPLETE CBC W/AUTO DIFF WBC: CPT | Performed by: EMERGENCY MEDICINE

## 2024-02-26 PROCEDURE — 63700000 HC SELF-ADMINISTRABLE DRUG: Performed by: SURGERY

## 2024-02-26 PROCEDURE — 86901 BLOOD TYPING SEROLOGIC RH(D): CPT

## 2024-02-26 PROCEDURE — 74176 CT ABD & PELVIS W/O CONTRAST: CPT | Mod: ME

## 2024-02-26 PROCEDURE — 83605 ASSAY OF LACTIC ACID: CPT

## 2024-02-26 PROCEDURE — 63600000 HC DRUGS/DETAIL CODE: Performed by: EMERGENCY MEDICINE

## 2024-02-26 PROCEDURE — 83690 ASSAY OF LIPASE: CPT

## 2024-02-26 PROCEDURE — 63600000 HC DRUGS/DETAIL CODE: Performed by: SURGERY

## 2024-02-26 PROCEDURE — 85730 THROMBOPLASTIN TIME PARTIAL: CPT

## 2024-02-26 PROCEDURE — 96375 TX/PRO/DX INJ NEW DRUG ADDON: CPT

## 2024-02-26 PROCEDURE — 80053 COMPREHEN METABOLIC PANEL: CPT

## 2024-02-26 PROCEDURE — 87040 BLOOD CULTURE FOR BACTERIA: CPT

## 2024-02-26 PROCEDURE — 93005 ELECTROCARDIOGRAM TRACING: CPT

## 2024-02-26 PROCEDURE — 80053 COMPREHEN METABOLIC PANEL: CPT | Performed by: EMERGENCY MEDICINE

## 2024-02-26 PROCEDURE — 20600000 HC ROOM AND CARE INTERMEDIATE/TELEMETRY

## 2024-02-26 PROCEDURE — G1004 CDSM NDSC: HCPCS

## 2024-02-26 PROCEDURE — 25000000 HC PHARMACY GENERAL: Performed by: SURGERY

## 2024-02-26 PROCEDURE — 99284 EMERGENCY DEPT VISIT MOD MDM: CPT | Mod: 25

## 2024-02-26 PROCEDURE — 25800000 HC PHARMACY IV SOLUTIONS: Performed by: SURGERY

## 2024-02-26 RX ORDER — DEXTROSE 50 % IN WATER (D50W) INTRAVENOUS SYRINGE
25 AS NEEDED
Status: DISCONTINUED | OUTPATIENT
Start: 2024-02-26 | End: 2024-02-28 | Stop reason: HOSPADM

## 2024-02-26 RX ORDER — DEXTROSE 40 %
15-30 GEL (GRAM) ORAL AS NEEDED
Status: DISCONTINUED | OUTPATIENT
Start: 2024-02-26 | End: 2024-02-28 | Stop reason: HOSPADM

## 2024-02-26 RX ORDER — PANTOPRAZOLE SODIUM 40 MG/10ML
40 INJECTION, POWDER, LYOPHILIZED, FOR SOLUTION INTRAVENOUS EVERY 24 HOURS
Status: DISCONTINUED | OUTPATIENT
Start: 2024-02-26 | End: 2024-02-28 | Stop reason: HOSPADM

## 2024-02-26 RX ORDER — METRONIDAZOLE 500 MG/100ML
500 INJECTION, SOLUTION INTRAVENOUS
Qty: 2100 ML | Refills: 0 | Status: DISCONTINUED | OUTPATIENT
Start: 2024-02-27 | End: 2024-02-28 | Stop reason: HOSPADM

## 2024-02-26 RX ORDER — HYDROMORPHONE HYDROCHLORIDE 1 MG/ML
1 INJECTION, SOLUTION INTRAMUSCULAR; INTRAVENOUS; SUBCUTANEOUS EVERY 2 HOUR PRN
Status: DISCONTINUED | OUTPATIENT
Start: 2024-02-26 | End: 2024-02-27

## 2024-02-26 RX ORDER — ONDANSETRON HYDROCHLORIDE 2 MG/ML
4 INJECTION, SOLUTION INTRAVENOUS ONCE
Status: COMPLETED | OUTPATIENT
Start: 2024-02-26 | End: 2024-02-26

## 2024-02-26 RX ORDER — ALPRAZOLAM 0.5 MG/1
0.5 TABLET ORAL 2 TIMES DAILY PRN
Status: DISCONTINUED | OUTPATIENT
Start: 2024-02-26 | End: 2024-02-28 | Stop reason: HOSPADM

## 2024-02-26 RX ORDER — POTASSIUM CHLORIDE 750 MG/1
40 TABLET, EXTENDED RELEASE ORAL AS NEEDED
Status: DISCONTINUED | OUTPATIENT
Start: 2024-02-26 | End: 2024-02-28 | Stop reason: HOSPADM

## 2024-02-26 RX ORDER — ROSUVASTATIN CALCIUM 5 MG/1
10 TABLET, COATED ORAL DAILY
Status: DISCONTINUED | OUTPATIENT
Start: 2024-02-26 | End: 2024-02-28 | Stop reason: HOSPADM

## 2024-02-26 RX ORDER — DIPHENHYDRAMINE HCL 50 MG/ML
25 VIAL (ML) INJECTION EVERY 6 HOURS PRN
Status: DISCONTINUED | OUTPATIENT
Start: 2024-02-26 | End: 2024-02-28 | Stop reason: HOSPADM

## 2024-02-26 RX ORDER — KETOROLAC TROMETHAMINE 15 MG/ML
15 INJECTION, SOLUTION INTRAMUSCULAR; INTRAVENOUS EVERY 6 HOURS PRN
Status: DISCONTINUED | OUTPATIENT
Start: 2024-02-26 | End: 2024-02-28 | Stop reason: HOSPADM

## 2024-02-26 RX ORDER — IBUPROFEN 200 MG
16-32 TABLET ORAL AS NEEDED
Status: DISCONTINUED | OUTPATIENT
Start: 2024-02-26 | End: 2024-02-28 | Stop reason: HOSPADM

## 2024-02-26 RX ORDER — ROSUVASTATIN CALCIUM 10 MG/1
TABLET, COATED ORAL
COMMUNITY

## 2024-02-26 RX ORDER — METFORMIN HYDROCHLORIDE 500 MG/1
500 TABLET, EXTENDED RELEASE ORAL DAILY
COMMUNITY
Start: 2024-02-02

## 2024-02-26 RX ORDER — DEXTROSE MONOHYDRATE, SODIUM CHLORIDE, AND POTASSIUM CHLORIDE 50; 1.49; 4.5 G/1000ML; G/1000ML; G/1000ML
INJECTION, SOLUTION INTRAVENOUS CONTINUOUS
Status: DISCONTINUED | OUTPATIENT
Start: 2024-02-26 | End: 2024-02-28 | Stop reason: HOSPADM

## 2024-02-26 RX ORDER — ALPRAZOLAM 0.5 MG/1
0.5 TABLET ORAL NIGHTLY
COMMUNITY
Start: 2024-01-29

## 2024-02-26 RX ORDER — METRONIDAZOLE 500 MG/100ML
500 INJECTION, SOLUTION INTRAVENOUS ONCE
Status: COMPLETED | OUTPATIENT
Start: 2024-02-26 | End: 2024-02-26

## 2024-02-26 RX ORDER — POTASSIUM CHLORIDE 750 MG/1
20 TABLET, EXTENDED RELEASE ORAL AS NEEDED
Status: DISCONTINUED | OUTPATIENT
Start: 2024-02-26 | End: 2024-02-28 | Stop reason: HOSPADM

## 2024-02-26 RX ORDER — ONDANSETRON HYDROCHLORIDE 2 MG/ML
4 INJECTION, SOLUTION INTRAVENOUS EVERY 8 HOURS PRN
Status: DISCONTINUED | OUTPATIENT
Start: 2024-02-26 | End: 2024-02-28 | Stop reason: HOSPADM

## 2024-02-26 RX ORDER — ACETAMINOPHEN 325 MG/1
650 TABLET ORAL EVERY 6 HOURS PRN
Status: DISCONTINUED | OUTPATIENT
Start: 2024-02-26 | End: 2024-02-28 | Stop reason: HOSPADM

## 2024-02-26 RX ORDER — VALSARTAN AND HYDROCHLOROTHIAZIDE 160; 25 MG/1; MG/1
1 TABLET ORAL DAILY
COMMUNITY
Start: 2024-02-02

## 2024-02-26 RX ORDER — MORPHINE SULFATE 4 MG/ML
4 INJECTION, SOLUTION INTRAMUSCULAR; INTRAVENOUS ONCE
Status: COMPLETED | OUTPATIENT
Start: 2024-02-26 | End: 2024-02-26

## 2024-02-26 RX ORDER — HYDROMORPHONE HYDROCHLORIDE 1 MG/ML
1 INJECTION, SOLUTION INTRAMUSCULAR; INTRAVENOUS; SUBCUTANEOUS ONCE
Status: COMPLETED | OUTPATIENT
Start: 2024-02-26 | End: 2024-02-26

## 2024-02-26 RX ADMIN — HYDROMORPHONE HYDROCHLORIDE 1 MG: 1 INJECTION, SOLUTION INTRAMUSCULAR; INTRAVENOUS; SUBCUTANEOUS at 19:55

## 2024-02-26 RX ADMIN — POTASSIUM CHLORIDE, DEXTROSE MONOHYDRATE AND SODIUM CHLORIDE: 150; 5; 450 INJECTION, SOLUTION INTRAVENOUS at 20:28

## 2024-02-26 RX ADMIN — ALPRAZOLAM 0.5 MG: 0.5 TABLET ORAL at 22:35

## 2024-02-26 RX ADMIN — CEFTRIAXONE SODIUM 1 G: 1 INJECTION, POWDER, FOR SOLUTION INTRAMUSCULAR; INTRAVENOUS at 18:57

## 2024-02-26 RX ADMIN — HYDROMORPHONE HYDROCHLORIDE 1 MG: 1 INJECTION, SOLUTION INTRAMUSCULAR; INTRAVENOUS; SUBCUTANEOUS at 17:25

## 2024-02-26 RX ADMIN — KETOROLAC TROMETHAMINE 15 MG: 15 INJECTION, SOLUTION INTRAMUSCULAR; INTRAVENOUS at 19:55

## 2024-02-26 RX ADMIN — SODIUM CHLORIDE 1000 ML: 9 INJECTION, SOLUTION INTRAVENOUS at 15:46

## 2024-02-26 RX ADMIN — PANTOPRAZOLE SODIUM 40 MG: 40 INJECTION, POWDER, LYOPHILIZED, FOR SOLUTION INTRAVENOUS at 19:53

## 2024-02-26 RX ADMIN — METRONIDAZOLE 500 MG: 500 INJECTION, SOLUTION INTRAVENOUS at 17:50

## 2024-02-26 RX ADMIN — ACETAMINOPHEN 650 MG: 325 TABLET, FILM COATED ORAL at 19:55

## 2024-02-26 RX ADMIN — MORPHINE SULFATE 4 MG: 4 INJECTION, SOLUTION INTRAMUSCULAR; INTRAVENOUS at 15:47

## 2024-02-26 RX ADMIN — ONDANSETRON 4 MG: 2 INJECTION INTRAMUSCULAR; INTRAVENOUS at 15:47

## 2024-02-26 RX ADMIN — HYDROMORPHONE HYDROCHLORIDE 1 MG: 1 INJECTION, SOLUTION INTRAMUSCULAR; INTRAVENOUS; SUBCUTANEOUS at 22:35

## 2024-02-26 ASSESSMENT — ENCOUNTER SYMPTOMS
APPETITE CHANGE: 1
SHORTNESS OF BREATH: 0
CHILLS: 1
ABDOMINAL PAIN: 1
BLOOD IN STOOL: 0
VOMITING: 0
NAUSEA: 1
FEVER: 0

## 2024-02-26 ASSESSMENT — COGNITIVE AND FUNCTIONAL STATUS - GENERAL
CLIMB 3 TO 5 STEPS WITH RAILING: 4 - NONE
WALKING IN HOSPITAL ROOM: 4 - NONE
STANDING UP FROM CHAIR USING ARMS: 4 - NONE
MOVING TO AND FROM BED TO CHAIR: 4 - NONE

## 2024-02-26 NOTE — H&P
General Surgery History and Physical  General Surgery History and Physical     Chief Complaint   Patient presents with   • Abdominal Pain       HPI     Patient is a 58 y.o. male who presents with sigmoid diverticulitis. Known hx diverticulosis pancolonic. 2 days LLQ pain, gradual in onset, now more severe. CT confirms microperforative sigmoid diverticulitis.    Medical History:   Past Medical History:   Diagnosis Date   • Atrial fibrillation (CMS/HCC)    • Hypertension    • Kidney stone    • Lipid disorder        Surgical History:   Past Surgical History:   Procedure Laterality Date   • CARDIAC ELECTROPHYSIOLOGY MAPPING AND ABLATION     • LITHOTRIPSY         Social History:   Social History     Social History Narrative   • Not on file       Family History: History reviewed. No pertinent family history.    Allergies: Iodinated contrast media and Amoxicillin    Home Medications:  Not in a hospital admission.    Current Medications:  •  cefTRIAXone, 1 g, intravenous, Once  •  metroNIDAZOLE, 500 mg, intravenous, Once  •  risankizumab-rzaa (SKYRIZI SUBQ)  •  ALPRAZolam  •  metFORMIN XR  •  rosuvastatin  •  valsartan-hydrochlorothiazide    Review of Systems  No chest pain or SOB  Hx afib, s/p ablation  Followed by Dr. Ramesh, cardiology    Objective     Vital Signs for the last 24 hours:  Temp:  [36.6 °C (97.9 °F)] 36.6 °C (97.9 °F)  Heart Rate:  [96] 96  Resp:  [18] 18  BP: (117)/(86) 117/86    Physicial Exam  Physical Exam   Tender LLQ, nonperitoneal    Labs  Lab Results   Component Value Date    WBC 15.10 (H) 02/26/2024    HGB 17.0 02/26/2024    HCT 49.6 02/26/2024    MCV 88.4 02/26/2024     02/26/2024     Lab Results   Component Value Date    GLUCOSE 123 (H) 02/26/2024    CALCIUM 9.6 02/26/2024     02/26/2024    K 3.6 02/26/2024    CO2 27 02/26/2024     02/26/2024    BUN 17 02/26/2024    CREATININE 0.9 02/26/2024     Lab Results   Component Value Date    ALT 16 02/26/2024    AST 11 (L) 02/26/2024     ALKPHOS 50 02/26/2024    BILITOT 2.9 (H) 02/26/2024       Imaging  I have reviewed the Imaging from the last 24 hrs.    Assessment   Sigmoid microperforative diverticulitis  Plan   Clears, IV, IV abx, pain med, cardiology consult  Code Status: No Order

## 2024-02-26 NOTE — ED ATTESTATION NOTE
Procedures  Physical Exam  Review of Systems    2/26/20244:45 PM  I have personally seen and examined the patient.  I reviewed and agree with the PA/NP/Resident's assessment and plan of care.    My examination, assessment, and plan of care of Ino Carrion is as follows:  The patient presents with left lower quadrant abdominal pain since yesterday.  The pain has been gradual in onset.  The patient has a history of diverticulosis/diverticulitis and he had a recent colonoscopy 10 days ago.  The patient denies fevers or chills, nausea or vomiting, diarrhea, etc.    Exam: The patient is alert in no acute distress.  There is tenderness in his left lower quadrant with mild voluntary guarding.  There is no rebound tenderness.  There is no CVA tenderness.    Impression/Plan: There is an elevated white blood cell count of 15,000 and the patient went for CAT scan revealing a large area of diverticulitis with a possible phlegmon in the left lower quadrant.  We are waiting for radiology to read the images.  The patient will be treated with IV Dilaudid and IV antibiotics.    Radiology called and stated that the patient does in fact have diverticulitis and there is some free air in the left upper quadrant.  There is also some inflammatory changes around the left adrenal.  The patient is being treated with IV antibiotics, IV fluids and he will be admitted to the surgical service.    Vital Sign Review: Vital signs have been ordered and reviewed. The oxygen saturation is 97% on room air, normal     I was physically present for the key/critical portions of the following procedures: None    This document was created using dragon dictation software.  There might be some typographical errors due to this technology.     Jason Sue,   02/26/24 0449

## 2024-02-26 NOTE — ED PROVIDER NOTES
Emergency Medicine Note  HPI   HISTORY OF PRESENT ILLNESS       History provided by:  Patient and spouse   used: No      58-year-old male with past medical history of hypertension, nephrolithiasis, hyperlipidemia, atrial fibrillation s/p ablation not anticoagulated presents emergency department for evaluation of left lower quadrant abdominal pain onset yesterday.  Patient states this pain was sudden in onset localized to the left lower quadrant.  Describes it as a constant dull ache that is rated a 5 out of 10 with occasional intermittent stabbing pains rated 8 out of 10.  He is taking Tylenol at home which temporarily relieves his pain for about an hour.  Admits to associated nausea and decreased appetite.  Denies any vomiting or diarrhea.  No reported fevers but is having chills.  Patient reports his symptoms feel somewhat similar to 2 prior episodes of diverticulitis. Denies any flank pain, urinary symptoms. Most recent episode of diverticulitis in November 2023 with microperforation however patient was treated adequately with oral antibiotics.  He does follow with gastroenterology (Dr. Asher).  No history of abdominal surgeries.      Cardiology: Gadiel         Patient History   PAST HISTORY     Reviewed from Nursing Triage:  Tobacco  Allergies  Meds  Problems  Med Hx  Surg Hx  Fam Hx  Soc   Hx      Past Medical History:   Diagnosis Date    Atrial fibrillation (CMS/HCC)     Hypertension     Kidney stone     Lipid disorder        Past Surgical History:   Procedure Laterality Date    CARDIAC ELECTROPHYSIOLOGY MAPPING AND ABLATION      LITHOTRIPSY         History reviewed. No pertinent family history.    Social History     Tobacco Use    Smoking status: Never    Smokeless tobacco: Never   Substance Use Topics    Alcohol use: Yes    Drug use: No         Review of Systems   REVIEW OF SYSTEMS     Review of Systems   Constitutional:  Positive for appetite change and chills. Negative for  fever.   Respiratory:  Negative for shortness of breath.    Cardiovascular:  Negative for chest pain.   Gastrointestinal:  Positive for abdominal pain and nausea. Negative for blood in stool and vomiting.         VITALS     ED Vitals      Date/Time Temp Pulse Resp BP SpO2 [facility]   02/26/24 1959 -- -- 16 132/83 97 %    02/26/24 1851 -- -- 17 120/90 98 % [facility]   02/26/24 1800 -- 78 18 124/89 99 % [facility]   02/26/24 1419 36.6 °C (97.9 °F) 96 18 117/86 97 % [facility]          Pulse Ox %: 97 % (02/26/24 1508)  Pulse Ox Interpretation: Normal (02/26/24 1508)  Heart Rate: 96 (02/26/24 1508)     Vital Signs Review: Vital signs have been reviewed. The oxygen saturation is SpO2: 97 % which is normal      Physical Exam   PHYSICAL EXAM     Physical Exam  Vitals and nursing note reviewed.   Constitutional:       General: He is awake. He is not in acute distress.     Appearance: Normal appearance. He is well-developed, well-groomed and normal weight. He is not ill-appearing, toxic-appearing or diaphoretic.      Comments: Appears uncomfortable and in pain  Ambulates with antalgic gait   HENT:      Head: Normocephalic and atraumatic.      Mouth/Throat:      Lips: Pink.      Mouth: Mucous membranes are moist.   Cardiovascular:      Rate and Rhythm: Normal rate.      Heart sounds: Normal heart sounds.   Pulmonary:      Effort: Pulmonary effort is normal. No respiratory distress.      Breath sounds: Normal breath sounds and air entry.   Abdominal:      General: Bowel sounds are normal. There is no distension.      Palpations: Abdomen is soft.      Tenderness: There is abdominal tenderness (moderate tenderness) in the left lower quadrant. There is no right CVA tenderness, left CVA tenderness, guarding or rebound.   Skin:     General: Skin is warm and dry.      Capillary Refill: Capillary refill takes less than 2 seconds.   Neurological:      Mental Status: He is alert and oriented to person, place, and time.      GCS: GCS eye subscore is 4. GCS  verbal subscore is 5. GCS motor subscore is 6.   Psychiatric:         Behavior: Behavior is cooperative.           PROCEDURES     Procedures     DATA     Results       Procedure Component Value Units Date/Time    Blood Culture Blood, Venous [[id number]]  (Normal) Collected: 02/26/24 1716    Specimen: Blood, Venous Updated: 03/02/24 0000     Culture No growth at 96 hours    Blood Culture Blood, Venous [[id number]]  (Normal) Collected: 02/26/24 1716    Specimen: Blood, Venous Updated: 03/02/24 0000     Culture No growth at 96 hours    Type and screen [[id number]] Collected: 02/26/24 1856    Specimen: Blood, Venous Updated: 02/26/24 2037     Specimen Expiration 02/29/2024     Antibody Screen Negative     ABO B     Rh Factor Positive     History Check No type on file    APTT [[id number]]  (Normal) Collected: 02/26/24 1426    Specimen: Blood, Venous Updated: 02/26/24 1759     PTT 32 sec     Protime-INR [[id number]]  (Normal) Collected: 02/26/24 1426    Specimen: Blood, Venous Updated: 02/26/24 1759     PT 14.5 sec      INR 1.1     Comment: INR has no defined significance when PT is within Reference Range.       Lactate, w/ reflex repeat if > 2.0 [[id number]]  (Normal) Collected: 02/26/24 1716    Specimen: Blood, Venous Updated: 02/26/24 1731     Lactate 0.8 mmol/L     UA w/ reflex culture (ED Only) [[id number]]  (Abnormal) Collected: 02/26/24 1546    Specimen: Urine, Clean Catch Updated: 02/26/24 1631    Narrative:      The following orders were created for panel order UA w/ reflex culture (ED Only).  Procedure                               Abnormality         Status                     ---------                               -----------         ------                     UA Reflex to Culture (Ma...[[id number]]  Abnormal            Final result               UA Microscopic[[id number]]               Abnormal            Final result                 Please view results for these tests on the individual orders.    UA Reflex to  Culture (Macroscopic) [[id number]]  (Abnormal) Collected: 02/26/24 1546    Specimen: Urine, Clean Catch Updated: 02/26/24 1631     Color, Urine Orange     Clarity, Urine Clear     Specific Gravity, Urine 1.032     pH, Urine 6.0     Leukocyte Esterase Negative     Comment: Results can be falsely negative due to high specific gravity, some antibiotics, glucose >3 g/dl, or WBC other than neutrophils.        Nitrite, Urine Negative     Protein, Urine Trace     Comment: Trace False Positive Protein can be seen with alkaline or highly buffered urines or urine with high specific gravity. Suggest clinical correlation.        Glucose, Urine Negative mg/dL      Ketones, Urine Negative mg/dL      Urobilinogen, Urine 2.0 EU/dL      Bilirubin, Urine Negative mg/dL      Blood, Urine Trace     Comment: The sensitivity of the occult blood test is equivalent to approximately 4 intact RBC/HPF.       UA Microscopic [[id number]]  (Abnormal) Collected: 02/26/24 1546    Specimen: Urine, Clean Catch Updated: 02/26/24 1631     RBC, Urine 0 TO 4 /HPF      WBC, Urine 0 TO 3 /HPF      Squamous Epithelial None Seen /hpf      Hyaline Cast None Seen /lpf      Bacteria, Urine Rare /HPF      Mucus +1 /LPF     Lipase [[id number]]  (Normal) Collected: 02/26/24 1426    Specimen: Blood, Venous Updated: 02/26/24 1534     Lipase 15 U/L     Comprehensive metabolic panel [[id number]]  (Abnormal) Collected: 02/26/24 1426    Specimen: Blood, Venous Updated: 02/26/24 1504     Sodium 138 mEQ/L      Potassium 3.6 mEQ/L      Comment: Results obtained on plasma. Plasma Potassium values may be up to 0.4 mEQ/L less than serum values. The differences may be greater for patients with high platelet or white cell counts.        Chloride 103 mEQ/L      CO2 27 mEQ/L      BUN 17 mg/dL      Creatinine 0.9 mg/dL      Glucose 123 mg/dL      Calcium 9.6 mg/dL      AST (SGOT) 11 IU/L      ALT (SGPT) 16 IU/L      Alkaline Phosphatase 50 IU/L      Total Protein 7.4 g/dL       Comment: Test performed on plasma which typically contains approximately 0.4 g/dL more protein than serum.        Albumin 4.6 g/dL      Bilirubin, Total 2.9 mg/dL      eGFR >60.0 mL/min/1.73m*2      Comment: Calculation based on the Chronic Kidney Disease Epidemiology Collaboration (CKD-EPI) equation refit without adjustment for race.        Anion Gap 8 mEQ/L     CBC and differential [[id number]]  (Abnormal) Collected: 02/26/24 1426    Specimen: Blood, Venous Updated: 02/26/24 1449     WBC 15.10 K/uL      RBC 5.61 M/uL      Hemoglobin 17.0 g/dL      Hematocrit 49.6 %      MCV 88.4 fL      MCH 30.3 pg      MCHC 34.3 g/dL      RDW 12.8 %      Platelets 268 K/uL      MPV 9.6 fL      Differential Type Auto     nRBC 0.0 %      Immature Granulocytes 0.4 %      Neutrophils 78.7 %      Lymphocytes 14.2 %      Monocytes 5.6 %      Eosinophils 0.8 %      Basophils 0.3 %      Immature Granulocytes, Absolute 0.06 K/uL      Neutrophils, Absolute 11.88 K/uL      Lymphocytes, Absolute 2.15 K/uL      Monocytes, Absolute 0.85 K/uL      Eosinophils, Absolute 0.12 K/uL      Basophils, Absolute 0.04 K/uL     RAINBOW LT BLUE [[id number]] Collected: 02/26/24 1426    Specimen: Blood, Venous Updated: 02/26/24 1434    [place] Draw Panel [[id number]] Collected: 02/26/24 1426    Specimen: Blood, Venous Updated: 02/26/24 1433    Narrative:      The following orders were created for panel order [place] Draw Panel.  Procedure                               Abnormality         Status                     ---------                               -----------         ------                     RAINBOW RED[[id number]]                                      In process                 RAINBOW LT BLUE[[id number]]                                  In process                 RAINBOW GOLD[[id number]]                                     In process                   Please view results for these tests on the individual orders.    Extra Tubes [[id number]] Collected:  02/26/24 1426    Specimen: Blood, Venous Updated: 02/26/24 1433    Narrative:      The following orders were created for panel order Extra Tubes.  Procedure                               Abnormality         Status                     ---------                               -----------         ------                     Extra Tube Lt Green[[id number]]                              In process                   Please view results for these tests on the individual orders.    RAINBOW RED [[id number]] Collected: 02/26/24 1426    Specimen: Blood, Venous Updated: 02/26/24 1433    RAINBOW GOLD [[id number]] Collected: 02/26/24 1426    Specimen: Blood, Venous Updated: 02/26/24 1433    Extra Tube Lt Green [[id number]] Collected: 02/26/24 1426    Specimen: Blood, Venous Updated: 02/26/24 1433            Imaging Results              CT ABDOMEN PELVIS WITHOUT IV CONTRAST (Final result)  Result time 02/26/24 16:50:01      Final result                   Impression:    IMPRESSION: Sigmoid diverticulitis with perforation. Normal appendix. Possible  left adrenal mass, correlate clinically.    Results discussed with and critically read back by Dr. Sue in the emergency  room at 4:48 PM.    STUDY: Axial CT images of the abdomen and pelvis were obtained from the lung  bases to the pubic symphysis. No IV or oral contrast administered for this  examination. Images were reviewed in soft tissue, lung and bone windows.    CT DOSE:  One or more dose reduction techniques (e.g. automated exposure  control, adjustment of the mA and/or kV according to the patient size, use of  iterative reconstruction technique) utilized for this examination.    COMMENT:      GI System:  Liver:  Normal in size and appearance.  Gallbladder and bile ducts:  Normal.  Pancreas:  Normal.  Stomach:  Normal in size and appearance.  Small bowel:  Normal in caliber and appearance.  Large bowel:  Numerous diverticula scattered throughout the entire length of  the colon.  There is marked wall thickening and inflammatory changes involving  the proximal sigmoid colon. There is evidence of perforation with multiple foci  of air in the left upper quadrant. No definite fistulous communication. Normal  appendix.     System:  Adrenals:  Inflammatory stranding surrounding the left adrenal gland. 1.5 x  0.9 cm left adrenal nodule of the medial limb.  Kidneys:  Normal in position and size, no hydronephrosis. Left lower pole  cyst measuring 1.2 cm. Exophytic interpolar cyst on the left posteriorly  measuring 1.4 cm.  Urinary bladder:  Normal.  Prostate gland and seminal vesicles: Mildly enlarged prostate measuring 4.0  x 5.5 cm.    RES System:  Spleen:  Normal in size and appearance. 1.7 cm splenule.  Lymph nodes:  No mesenteric or retroperitoneal lymphadenopathy. No pelvic or  inguinal lymphadenopathy.    Other:  Peritoneum:  Free air. No free fluid.  Aorta and iliac arteries:  Atherosclerotic calcifications of the abdominal  aorta.  Abdominal wall: Normal.  Lower lungs and pleura:  Clear bilaterally.  Visualized axial skeleton:  Intact.               Narrative:    CLINICAL HISTORY: Left lower quadrant pain.    COMPARISON: CT dated June 20, 2023.                                      ECG 12 lead          Scoring tools                                  ED Course & MDM   MDM / ED COURSE / CLINICAL IMPRESSION / DISPO     Medical Decision Making  Problems Addressed:  Diverticulitis of colon with perforation: acute illness or injury    Amount and/or Complexity of Data Reviewed  Independent Historian: spouse  Labs: ordered. Decision-making details documented in ED Course.  Radiology: ordered. Decision-making details documented in ED Course.  ECG/medicine tests: ordered.  Discussion of management or test interpretation with external provider(s): See ED course    Risk  Prescription drug management.  Decision regarding hospitalization.        ED Course as of 03/04/24 0058   Mon Feb 26, 2024   2788  WBC(!): 15.10 [CK]   1545 Bilirubin, Total(!): 2.9 [CK]   1652 CT ABDOMEN PELVIS WITHOUT IV CONTRAST  IMPRESSION: Sigmoid diverticulitis with perforation. Normal appendix. Possible  left adrenal mass, correlate clinically. [CK]   5480 General surgery paged. Patient updated with results and plan for admission. IV abx ordered. Additional pain medication ordered  [CK]   6413 Case discussed with general surgery (Dr. Hastings). Reviewed patient's presentation, ED course, and relevant data. General surgery accepts patient on their service and will see/admit. [CK]      ED Course User Index  [CK] Lori Elliott PA C     Clinical Impression      Diverticulitis of colon with perforation     _________________       ED Disposition   Admit / Observation                     Lori Elliott PA C  03/04/24 0058

## 2024-02-27 LAB
ANION GAP SERPL CALC-SCNC: 5 MEQ/L (ref 3–15)
ATRIAL RATE: 81
BUN SERPL-MCNC: 18 MG/DL (ref 7–25)
CALCIUM SERPL-MCNC: 8.7 MG/DL (ref 8.6–10.3)
CHLORIDE SERPL-SCNC: 103 MEQ/L (ref 98–107)
CO2 SERPL-SCNC: 28 MEQ/L (ref 21–31)
CREAT SERPL-MCNC: 0.9 MG/DL (ref 0.7–1.3)
EGFRCR SERPLBLD CKD-EPI 2021: >60 ML/MIN/1.73M*2
ERYTHROCYTE [DISTWIDTH] IN BLOOD BY AUTOMATED COUNT: 12.9 % (ref 11.6–14.4)
GLUCOSE SERPL-MCNC: 110 MG/DL (ref 70–99)
HCT VFR BLD AUTO: 42.9 % (ref 40.1–51)
HGB BLD-MCNC: 14.4 G/DL (ref 13.7–17.5)
MCH RBC QN AUTO: 30.2 PG (ref 28–33.2)
MCHC RBC AUTO-ENTMCNC: 33.6 G/DL (ref 32.2–36.5)
MCV RBC AUTO: 89.9 FL (ref 83–98)
P AXIS: 11
PDW BLD AUTO: 9.4 FL (ref 9.4–12.4)
PLATELET # BLD AUTO: 227 K/UL (ref 150–350)
POTASSIUM SERPL-SCNC: 4.2 MEQ/L (ref 3.5–5.1)
PR INTERVAL: 162
QRS DURATION: 76
QT INTERVAL: 358
QTC CALCULATION(BAZETT): 415
R AXIS: 70
RBC # BLD AUTO: 4.77 M/UL (ref 4.5–5.8)
SODIUM SERPL-SCNC: 136 MEQ/L (ref 136–145)
T WAVE AXIS: 10
VENTRICULAR RATE: 81
WBC # BLD AUTO: 9.21 K/UL (ref 3.8–10.5)

## 2024-02-27 PROCEDURE — 20600000 HC ROOM AND CARE INTERMEDIATE/TELEMETRY

## 2024-02-27 PROCEDURE — 25000000 HC PHARMACY GENERAL: Performed by: SURGERY

## 2024-02-27 PROCEDURE — 93010 ELECTROCARDIOGRAM REPORT: CPT | Performed by: INTERNAL MEDICINE

## 2024-02-27 PROCEDURE — 36415 COLL VENOUS BLD VENIPUNCTURE: CPT | Performed by: SURGERY

## 2024-02-27 PROCEDURE — 63600000 HC DRUGS/DETAIL CODE: Performed by: SURGERY

## 2024-02-27 PROCEDURE — 82310 ASSAY OF CALCIUM: CPT | Performed by: SURGERY

## 2024-02-27 PROCEDURE — 63700000 HC SELF-ADMINISTRABLE DRUG: Performed by: SURGERY

## 2024-02-27 PROCEDURE — 25800000 HC PHARMACY IV SOLUTIONS: Performed by: SURGERY

## 2024-02-27 PROCEDURE — 85027 COMPLETE CBC AUTOMATED: CPT | Performed by: SURGERY

## 2024-02-27 RX ORDER — HYDROMORPHONE HYDROCHLORIDE 1 MG/ML
1 INJECTION, SOLUTION INTRAMUSCULAR; INTRAVENOUS; SUBCUTANEOUS
Status: DISCONTINUED | OUTPATIENT
Start: 2024-02-27 | End: 2024-02-28

## 2024-02-27 RX ADMIN — ROSUVASTATIN CALCIUM 10 MG: 5 TABLET, FILM COATED ORAL at 08:39

## 2024-02-27 RX ADMIN — ALPRAZOLAM 0.5 MG: 0.5 TABLET ORAL at 21:15

## 2024-02-27 RX ADMIN — ACETAMINOPHEN 650 MG: 325 TABLET, FILM COATED ORAL at 01:32

## 2024-02-27 RX ADMIN — KETOROLAC TROMETHAMINE 15 MG: 15 INJECTION, SOLUTION INTRAMUSCULAR; INTRAVENOUS at 19:01

## 2024-02-27 RX ADMIN — ACETAMINOPHEN 650 MG: 325 TABLET, FILM COATED ORAL at 08:41

## 2024-02-27 RX ADMIN — HYDROMORPHONE HYDROCHLORIDE 1 MG: 1 INJECTION, SOLUTION INTRAMUSCULAR; INTRAVENOUS; SUBCUTANEOUS at 16:09

## 2024-02-27 RX ADMIN — KETOROLAC TROMETHAMINE 15 MG: 15 INJECTION, SOLUTION INTRAMUSCULAR; INTRAVENOUS at 01:30

## 2024-02-27 RX ADMIN — ALPRAZOLAM 0.5 MG: 0.5 TABLET ORAL at 11:01

## 2024-02-27 RX ADMIN — HYDROMORPHONE HYDROCHLORIDE 1 MG: 1 INJECTION, SOLUTION INTRAMUSCULAR; INTRAVENOUS; SUBCUTANEOUS at 02:58

## 2024-02-27 RX ADMIN — PANTOPRAZOLE SODIUM 40 MG: 40 INJECTION, POWDER, LYOPHILIZED, FOR SOLUTION INTRAVENOUS at 08:39

## 2024-02-27 RX ADMIN — HYDROCHLOROTHIAZIDE: 25 TABLET ORAL at 10:40

## 2024-02-27 RX ADMIN — CEFTRIAXONE SODIUM 1 G: 1 INJECTION, POWDER, FOR SOLUTION INTRAMUSCULAR; INTRAVENOUS at 18:49

## 2024-02-27 RX ADMIN — HYDROMORPHONE HYDROCHLORIDE 1 MG: 1 INJECTION, SOLUTION INTRAMUSCULAR; INTRAVENOUS; SUBCUTANEOUS at 21:13

## 2024-02-27 RX ADMIN — METRONIDAZOLE 500 MG: 500 INJECTION, SOLUTION INTRAVENOUS at 17:29

## 2024-02-27 RX ADMIN — HYDROMORPHONE HYDROCHLORIDE 1 MG: 1 INJECTION, SOLUTION INTRAMUSCULAR; INTRAVENOUS; SUBCUTANEOUS at 10:46

## 2024-02-27 RX ADMIN — METRONIDAZOLE 500 MG: 500 INJECTION, SOLUTION INTRAVENOUS at 10:40

## 2024-02-27 RX ADMIN — METRONIDAZOLE 500 MG: 500 INJECTION, SOLUTION INTRAVENOUS at 01:30

## 2024-02-27 RX ADMIN — HYDROMORPHONE HYDROCHLORIDE 1 MG: 1 INJECTION, SOLUTION INTRAMUSCULAR; INTRAVENOUS; SUBCUTANEOUS at 08:40

## 2024-02-27 RX ADMIN — POTASSIUM CHLORIDE, DEXTROSE MONOHYDRATE AND SODIUM CHLORIDE: 150; 5; 450 INJECTION, SOLUTION INTRAVENOUS at 06:56

## 2024-02-27 ASSESSMENT — COGNITIVE AND FUNCTIONAL STATUS - GENERAL
CLIMB 3 TO 5 STEPS WITH RAILING: 4 - NONE
STANDING UP FROM CHAIR USING ARMS: 4 - NONE
MOVING TO AND FROM BED TO CHAIR: 4 - NONE
STANDING UP FROM CHAIR USING ARMS: 4 - NONE
WALKING IN HOSPITAL ROOM: 4 - NONE
MOVING TO AND FROM BED TO CHAIR: 4 - NONE
WALKING IN HOSPITAL ROOM: 4 - NONE

## 2024-02-27 NOTE — CONSULTS
Inpatient consult to Cardiology  Consult performed by: Evonne Rodas PA C  Consult ordered by: Cooper Hastings MD               REASON FOR CONSULT: preop    CONSULT FROM: Cooper Hastings MD    PRIMARY CARDIOLOGIST: Dr. Ramesh     ------------------------------------------------------------------------------------------------------------------------------------------  HISTORY OF PRESENTING ILLNESS  ------------------------------------------------------------------------------------------------------------------------------------------  Ino Carrion is a 58 y.o. male who is admitted with diverticulitis c/b microperforation    # cardiac risk factors: HTN former smoker   # mild CAD  # HFrEF - mild, tachymediated  # atrial tachycardia, frequent PACs s/p ablation (Dr. Torres, 2010)  # PAF s/p PVI ablation 2017    Calcium score 12/2015: 34  TTE 10/29/2015: EF 65%, mild to moderate left atrial enlargement, aortic valve sclerosis  Nuclear stress test 2016: no ischemia   Chest CT 07/16/2021:  No aortic aneurysm    At baseline, the patient denies exertional chest pain, shortness of breath, orthopnea, PND, ankle edema, palpitations, or syncope.    Mr. Carrion has a history of diverticulitis, uncomplicated colonoscopy a few weeks ago. Now with increased abdominal discomfort the past few days. CT scan shows microperforation. Now on IV abx and clear liquid diet for now, no immediate plans for surgical intervention.      He has been doing very well from a cardiovascular perspective.      ------------------------------------------------------------------------------------------------------------------------------------------  PAST MEDICAL HISTORY  ------------------------------------------------------------------------------------------------------------------------------------------  Past Medical History:   Diagnosis Date   • Atrial fibrillation (CMS/HCC)    • Hypertension    • Kidney stone    • Lipid disorder       Past Surgical History:   Procedure Laterality Date   • CARDIAC ELECTROPHYSIOLOGY MAPPING AND ABLATION     • LITHOTRIPSY         ------------------------------------------------------------------------------------------------------------------------------------------  MEDICATIONS  ------------------------------------------------------------------------------------------------------------------------------------------  Home medications    •  ALPRAZolam, Take 0.5 mg by mouth nightly. for sleep  •  metFORMIN XR, Take 500 mg by mouth daily.  •  risankizumab-rzaa (SKYRIZI SUBQ), Inject under the skin.  •  rosuvastatin, daily.  •  valsartan-hydrochlorothiazide, Take 1 tablet by mouth daily.    Inpatient Medications    •  acetaminophen, 650 mg, oral, q6h PRN  •  ALPRAZolam, 0.5 mg, oral, 2x daily PRN  •  cefTRIAXone, 1 g, intravenous, q24h INT  •  glucose, 16-32 g of dextrose, oral, PRN **OR** dextrose, 15-30 g of dextrose, oral, PRN **OR** glucagon, 1 mg, intramuscular, PRN **OR** dextrose 50 % in water (D50), 25 mL, intravenous, PRN  •  potassium chloride-D5-0.45%NaCl, , intravenous, Continuous  •  diphenhydrAMINE, 25 mg, intravenous, q6h PRN  •  HYDROmorphone, 1 mg, intravenous, q2h PRN  •  ketorolac, 15 mg, intravenous, q6h PRN  •  metroNIDAZOLE, 500 mg, intravenous, q8h INT  •  ondansetron, 4 mg, intravenous, q8h PRN  •  pantoprazole, 40 mg, intravenous, q24h  •  potassium chloride, 20 mEq, oral, PRN  •  potassium chloride, 40 mEq, oral, PRN  •  rosuvastatin, 10 mg, oral, Daily  •  valsartan (DIOVAN) 160 mg, hydrochlorothiazide (HYDRODIURIL) 25 mg for DIOVAN -25, , oral, Daily    ------------------------------------------------------------------------------------------------------------------------------------------  ALLERGIES  ------------------------------------------------------------------------------------------------------------------------------------------  Iodinated contrast media and  Amoxicillin    ------------------------------------------------------------------------------------------------------------------------------------------  SOCIAL HISTORY  ------------------------------------------------------------------------------------------------------------------------------------------  No smoking or alcohol    ------------------------------------------------------------------------------------------------------------------------------------------  FAMILY HISTORY  ------------------------------------------------------------------------------------------------------------------------------------------  Brother had MI  Nephew Factor V Leiden     ------------------------------------------------------------------------------------------------------------------------------------------  REVIEW OF SYSTEMS  ------------------------------------------------------------------------------------------------------------------------------------------  Constitutional: - fever, - chills, - weakness, - weight loss  HEENT: - blurred vision, - sore throat, - hoarseness  Respiratory: - dyspnea, - cough, - hemoptysis  Cardiovascular: - chest pain, - dyspnea, - orthopnea, - PND, - edema, - palpitations, - syncope  Gastrointestinal: - nausea, - vomiting, - diarrhea, - hematemesis, - melena  Genitourinary: - dysuria, - frequency  Integument: - rash, - itching  Hematologic/lymphatic:  - bruising, - petechiae  Musculoskeletal: - arthalgias, - myalgias  Neurological: - vertigo, - tremors, - headache, - speech deficit, - focal weakness  Behavioral/Psych: - anxiety, - depression  Endocrine: - cold intolerance, - heat intolerance, - weight change    ------------------------------------------------------------------------------------------------------------------------------------------  PHYSICAL  EXAM  ------------------------------------------------------------------------------------------------------------------------------------------  VITAL SIGNS:  Temp:  [36.6 °C (97.9 °F)-36.7 °C (98 °F)] 36.7 °C (98 °F)  Heart Rate:  [78-96] 86  Resp:  [16-18] 18  BP: (108-132)/(61-90) 108/61  SaO2: 97%    Intake/Output Summary (Last 24 hours) at 2/27/2024 0759  Last data filed at 2/27/2024 0715  Gross per 24 hour   Intake 1278.33 ml   Output --   Net 1278.33 ml       PHYSICAL EXAM:  General appearance: alert and cooperative  Head: without obvious abnormality  Eyes: PERRLA, extraocular movements intact  Neck: No JVD, carotid bruits, thyromegaly  Lungs: clear to auscultation bilaterally, no crackles or wheezing  Heart: regular rate and rhythm, S1-S2 normal, no murmurs, clicks, rubs or gallops  Abdomen: soft, non-tender, bowel sounds normal  Extremities: no edema, peripheral pulses present  Skin: Skin color, texture, turgor normal. No rashes or lesions  Neurologic: Alert and oriented X 3, no focal deficits    ------------------------------------------------------------------------------------------------------------------------------------------  LABS / IMAGING / EKG / TELEMETRY  ------------------------------------------------------------------------------------------------------------------------------------------  LABS:  Results from last 7 days   Lab Units 02/27/24  0659 02/26/24  1426   SODIUM mEQ/L 136 138   POTASSIUM mEQ/L 4.2 3.6   CHLORIDE mEQ/L 103 103   CO2 mEQ/L 28 27   BUN mg/dL 18 17   CREATININE mg/dL 0.9 0.9   AST IU/L  --  11*   ALT IU/L  --  16     Results from last 7 days   Lab Units 02/27/24  0659 02/26/24  1426   WBC K/uL 9.21 15.10*   HEMOGLOBIN g/dL 14.4 17.0   HEMATOCRIT % 42.9 49.6   PLATELETS K/uL 227 268   INR   --  1.1     Lab Results   Component Value Date    TSH 3.89 08/11/2020     Lab Results   Component Value Date    CHOL 110 12/06/2017    LDLCALC 45 12/06/2017    HDL 39 (L) 12/06/2017  "   TRIG 130 2017     No results found for: \"BNP\"    IMAGING:  CT abdomen  IMPRESSION: Sigmoid diverticulitis with perforation. Normal appendix. Possible  left adrenal mass, correlate clinically.    EC2024 17:41 NSR rate 81, normal axis, normal QRS, no ST segment abnormalities       TELEMETRY:       ------------------------------------------------------------------------------------------------------------------------------------------  ASSESSMENT AND PLAN  ------------------------------------------------------------------------------------------------------------------------------------------    57 y/o male admitted with diverticulitis c/b microperforation    Diverticulitis: Per surgery. IV abx, clear liquid diet for now. Resting comfortably this morning.    Atrial tachycardia: s/p PAC / atrial tach ablation     Atrial fibrillation: s/p PVI ablation . No recurrent symptoms. [id number] is 1 for HTN. However, noted that he is on metformin. Pt denies any history of diabetes.    Hypertensive heart disease: BP excellent. Continue valsartan-HCTZ.     Dyslipidemia: Continue statin therapy     Preoperative cardiovascular examination: He is acceptable risk for intermediate risk abdominal surgery if clinically indicated. Telemetry perioperatively.          LUIS M Hogan  2024    Primary Care Doctor: Felix Wynn, DO  "

## 2024-02-27 NOTE — PLAN OF CARE
Care Coordination Admission Assessment Note    General Information:  Readmission Within the last 30 days: no previous admission in last 30 days  Does patient have a : No  Patient-Specific Goals (include timeframe): medical staility    Living Arrangements:  Arrived From: home  Current Living Arrangements: home  People in Home: spouse  Home Accessibility:    Living Arrangement Comments:      Housing Stability and Utility Access (SDOH):  In the last 12 months, was there a time when you were not able to pay the mortgage or rent on time?: No  In the last 12 months, how many places have you lived?:    In the last 12 months, was there a time when you did not have a steady place to sleep or slept in a shelter (including now)?: No  In the past 12 months has the electric, gas, oil, or water company threatened to shut off services in your home?: No    Functional Status Prior to Admission:   Assistive Device/Animal Currently Used at Home:    Functional Status Comments: ind  IADL Comments: ind     Supports and Services:  Current Outpatient/Agency/Support Group: none  Type of Current Home Care Services:    History of home care episode or rehab stay:      Discharge Needs Assessment:   Concerns to be Addressed: care coordination/care conferences, discharge planning  Current Discharge Risk:    Anticipated Changes Related to Illness:      Patient/Family Anticipated Discharge Plan:  Patient/Family Anticipates Transition To:    Patient/Family Anticipated Services at Transition:      Connection to Community       Patient Choice:   Offered/Gave Vendor List:    Patient's Choice of Community Agency(s):         Anticipated Discharge Plan:  Met with patient. Provided education and contact information for Care Coordination services.: yes  Anticipated Discharge Disposition:       Transportation Needs (SDOH):  Transportation Concerns:    Transportation Anticipated:    Is Out of Hospital DNR needed at discharge?: no    In the  past 12 months, has lack of transportation kept you from medical appointments or from getting medications?: No  In the past 12 months, has lack of transportation kept you from meetings, work, or from getting things needed for daily living?: No    Concerns - comments: pt is IND at baseline. lives with spouse. spouse will transport at dx.

## 2024-02-27 NOTE — PROGRESS NOTES
AVSS  Abd less tender, but still LLQ tenderness  In good spirits  WBC down to 9.2  Johnson clears  + BM this am  Will try full liquids and eggs  Thorough discussion re diverticulitis and surgical option

## 2024-02-28 VITALS
HEIGHT: 74 IN | HEART RATE: 63 BPM | DIASTOLIC BLOOD PRESSURE: 76 MMHG | WEIGHT: 205 LBS | BODY MASS INDEX: 26.31 KG/M2 | OXYGEN SATURATION: 97 % | TEMPERATURE: 98 F | RESPIRATION RATE: 18 BRPM | SYSTOLIC BLOOD PRESSURE: 126 MMHG

## 2024-02-28 PROCEDURE — 25000000 HC PHARMACY GENERAL: Performed by: SURGERY

## 2024-02-28 PROCEDURE — 63600000 HC DRUGS/DETAIL CODE: Mod: JZ,JG | Performed by: SURGERY

## 2024-02-28 PROCEDURE — 63700000 HC SELF-ADMINISTRABLE DRUG: Performed by: SURGERY

## 2024-02-28 PROCEDURE — 25800000 HC PHARMACY IV SOLUTIONS: Performed by: SURGERY

## 2024-02-28 RX ORDER — CEFUROXIME AXETIL 500 MG/1
500 TABLET ORAL 2 TIMES DAILY
Qty: 20 TABLET | Refills: 0 | Status: SHIPPED | OUTPATIENT
Start: 2024-02-28 | End: 2024-03-09

## 2024-02-28 RX ORDER — OXYCODONE HYDROCHLORIDE 5 MG/1
5 TABLET ORAL EVERY 6 HOURS PRN
Status: DISCONTINUED | OUTPATIENT
Start: 2024-02-28 | End: 2024-02-28 | Stop reason: HOSPADM

## 2024-02-28 RX ORDER — METRONIDAZOLE 500 MG/1
500 TABLET ORAL 3 TIMES DAILY
Qty: 30 TABLET | Refills: 0 | Status: SHIPPED | OUTPATIENT
Start: 2024-02-28 | End: 2024-03-09

## 2024-02-28 RX ADMIN — METRONIDAZOLE 500 MG: 500 INJECTION, SOLUTION INTRAVENOUS at 09:04

## 2024-02-28 RX ADMIN — METRONIDAZOLE 500 MG: 500 INJECTION, SOLUTION INTRAVENOUS at 03:03

## 2024-02-28 RX ADMIN — POTASSIUM CHLORIDE, DEXTROSE MONOHYDRATE AND SODIUM CHLORIDE: 150; 5; 450 INJECTION, SOLUTION INTRAVENOUS at 03:06

## 2024-02-28 RX ADMIN — ROSUVASTATIN CALCIUM 10 MG: 5 TABLET, FILM COATED ORAL at 08:19

## 2024-02-28 RX ADMIN — HYDROCHLOROTHIAZIDE: 25 TABLET ORAL at 08:58

## 2024-02-28 RX ADMIN — PANTOPRAZOLE SODIUM 40 MG: 40 INJECTION, POWDER, LYOPHILIZED, FOR SOLUTION INTRAVENOUS at 08:19

## 2024-02-28 ASSESSMENT — COGNITIVE AND FUNCTIONAL STATUS - GENERAL
MOVING TO AND FROM BED TO CHAIR: 4 - NONE
STANDING UP FROM CHAIR USING ARMS: 4 - NONE
WALKING IN HOSPITAL ROOM: 4 - NONE
CLIMB 3 TO 5 STEPS WITH RAILING: 4 - NONE

## 2024-02-28 NOTE — DISCHARGE INSTRUCTIONS
Low fiber (no fruits, veg, beans, nuts)  PO Ceftin and Flagyl 10 days  Follow up with Dr. Hastings in 2 weeks, [phone] for appt

## 2024-02-28 NOTE — PROGRESS NOTES
VSS  Much less pain  LLQ nontender  Abd soft  Cole fulls  Adv to low res  If cole, can d/c home on po Ceftin Flagyl

## 2024-02-28 NOTE — UM PHYSICIAN REVIEW NOTE
Patient Name: Ino Carrion      MRN: [id number]   2/26-2/28  CT confirms microperforative sigmoid diverticulitis    26-28  26  microperf  CT confirms microperforative sigmoid diverticulitis  Dilaudid 1mg iv x2  Flagyl and ceftriaxone   Daily abd evals    27 dilaudid iv x3, ketorolac iv x2    A phone call will be placed to payor to request Peer to Peer phone appeal.    Rj Gonsalez MD  2/28/2024

## 2024-02-28 NOTE — DISCHARGE SUMMARY
Inpatient Discharge Summary    BRIEF OVERVIEW  Admitting Provider: Cooper Hastings MD  Discharge Provider: Cooper Hastings MD  Primary Care Physician at Discharge: KingsleyFelix yen DO FABIOLA [phone]     Admission Date: 2/26/2024     Discharge Date: 2/28/2024    Primary Discharge Diagnosis  Diverticulitis of colon with perforation    Secondary Discharge Diagnosis      Discharge Disposition     Code Status at Discharge: Full Code    Discharge Medications     Medication List      ASK your doctor about these medications    ALPRAZolam 0.5 mg tablet  Commonly known as: XANAX  Take 0.5 mg by mouth nightly. for sleep  Dose: 0.5 mg     CRESTOR 10 mg tablet  daily.  Generic drug: rosuvastatin  Ask about: Which instructions should I use?     metFORMIN  mg 24 hr tablet  Commonly known as: GLUCOPHAGE-XR  Take 500 mg by mouth daily.  Dose: 500 mg     SKYRIZI SUBQ  Inject under the skin.     valsartan-hydrochlorothiazide 160-25 mg per tablet  Commonly known as: DIOVAN-HCT  Take 1 tablet by mouth daily.  Dose: 1 tablet            Active Issues Requiring Follow-up  F/u 1 - 2 weeks    Outpatient Follow-Up  Encounter Information    This patient does not currently have any appointments scheduled.             Test Results Pending at Discharge  Unresulted Labs (From admission, onward)     Start     Ordered    02/26/24 1426  [facility] Draw Panel  STAT        Question Answer Comment   Red Top 1 Label    Gold Top 1 Label    Light Blue 1 Label    Lavender Top No Labels    Pink Top No Labels    Yellow - Urine Tall No Labels    Urine Culture Tube No Labels    Blood Culture No Labels        02/26/24 1425    02/26/24 1426  Extra Tubes  Once        Question Answer Comment   Light Blue No Labels    Red Top No Labels    Light Green Top 1 Label    Lavender Top No Labels    Yellow - Urine Tall No Labels    Pink Top No Labels    Gold Top No Labels    Clear Top (Urine) No Labels    Urine Cup No Labels    Grey Top (Urine) No Labels         02/26/24 1425                DETAILS OF HOSPITAL STAY    Presenting Problem/History of Present Illness  Diverticulitis of colon with perforation [K57.20]      Operative Procedures Performed        Hospital Course  Improved rapidly on iv Ceftin, Flagyl  Diet advanced    Discharge Disposition  Disposition:    Destination:

## 2024-02-28 NOTE — PROGRESS NOTES
Known to me as an outpatient.  Stopped by patient's room today.  He is feeling better.  Agree with plans for outpatient elective hemicolectomy.

## 2024-02-28 NOTE — NURSING NOTE
Patient AAOx4. VSS. Patient denies pain, nausea or vomiting. Patient tolerated Low residue low fiber diet. Patient discharged with all personal belongings. IV removed.

## 2024-03-02 LAB
BACTERIA BLD CULT: NORMAL
BACTERIA BLD CULT: NORMAL

## 2024-05-20 ENCOUNTER — TRANSCRIBE ORDERS (OUTPATIENT)
Dept: REGISTRATION | Facility: HOSPITAL | Age: 59
End: 2024-05-20

## 2024-05-20 ENCOUNTER — APPOINTMENT (OUTPATIENT)
Dept: LAB | Facility: HOSPITAL | Age: 59
DRG: 331 | End: 2024-05-20
Attending: SURGERY
Payer: COMMERCIAL

## 2024-05-20 DIAGNOSIS — K57.32 DIVERTICULITIS OF LARGE INTESTINE WITHOUT PERFORATION OR ABSCESS WITHOUT BLEEDING: Primary | ICD-10-CM

## 2024-05-20 DIAGNOSIS — K57.32 DIVERTICULITIS OF LARGE INTESTINE WITHOUT PERFORATION OR ABSCESS WITHOUT BLEEDING: ICD-10-CM

## 2024-05-20 LAB
ANION GAP SERPL CALC-SCNC: 10 MEQ/L (ref 3–15)
BUN SERPL-MCNC: 23 MG/DL (ref 7–25)
CALCIUM SERPL-MCNC: 9.8 MG/DL (ref 8.6–10.3)
CHLORIDE SERPL-SCNC: 104 MEQ/L (ref 98–107)
CO2 SERPL-SCNC: 26 MEQ/L (ref 21–31)
CREAT SERPL-MCNC: 0.9 MG/DL (ref 0.7–1.3)
EGFRCR SERPLBLD CKD-EPI 2021: >60 ML/MIN/1.73M*2
ERYTHROCYTE [DISTWIDTH] IN BLOOD BY AUTOMATED COUNT: 13.1 % (ref 11.6–14.4)
GLUCOSE SERPL-MCNC: 127 MG/DL (ref 70–99)
HCT VFR BLD AUTO: 48.6 % (ref 40.1–51)
HGB BLD-MCNC: 16.4 G/DL (ref 13.7–17.5)
MCH RBC QN AUTO: 30.3 PG (ref 28–33.2)
MCHC RBC AUTO-ENTMCNC: 33.7 G/DL (ref 32.2–36.5)
MCV RBC AUTO: 89.8 FL (ref 83–98)
PDW BLD AUTO: 10 FL (ref 9.4–12.4)
PLATELET # BLD AUTO: 273 K/UL (ref 150–350)
POTASSIUM SERPL-SCNC: 4.1 MEQ/L (ref 3.5–5.1)
RBC # BLD AUTO: 5.41 M/UL (ref 4.5–5.8)
SODIUM SERPL-SCNC: 140 MEQ/L (ref 136–145)
WBC # BLD AUTO: 6.86 K/UL (ref 3.8–10.5)

## 2024-05-20 PROCEDURE — 85027 COMPLETE CBC AUTOMATED: CPT

## 2024-05-20 PROCEDURE — 36415 COLL VENOUS BLD VENIPUNCTURE: CPT

## 2024-05-20 PROCEDURE — 80048 BASIC METABOLIC PNL TOTAL CA: CPT

## 2024-06-11 ENCOUNTER — PRE-ADMISSION TESTING (OUTPATIENT)
Dept: PREADMISSION TESTING | Age: 59
End: 2024-06-11
Payer: COMMERCIAL

## 2024-06-11 VITALS — WEIGHT: 208 LBS | HEIGHT: 74 IN | BODY MASS INDEX: 26.69 KG/M2

## 2024-06-11 NOTE — PRE-PROCEDURE INSTRUCTIONS
[address] [address]    1.       We will call you between 3 pm and 7 pm on 6/14/24 to inform you of arrival time for your procedure. If you do not hear by 6PM, please call [phone] for arrival time.     2.        Please arrive through the Main Entrance of the [facility] (across from the parking garage) and report to the Surgery Registration Desk on the day of your procedure.    3. Please follow the following fasting guidelines:     No solid food EIGHT HOURS prior to arrival time on day of surgery.  6 ounces of clear liquids, meaning water or PLAIN black coffee WITHOUT any milk, cream, sugar, or sweetener are permitted up to TWO HOURS prior to arrival at the hospital.      Follow instructions given to you by Dr. Hastings.      4. Please take ONLY the following medications with a sip of water on the morning of your procedure: (populate names and/or NONE)    Xanax if needed    5. Other Instructions: You may brush your teeth the morning of the procedure. Rinse and spit, do not swallow.  Bring a list of your medications with dosages.  Use surgical wash as directed.     6. If you develop a cold, cough, fever, rash, or other symptom prior to the day of the procedure, please report it to your physician immediately.    7. If you need to cancel the procedure for any reason, please contact your physician.    8. Make arrangements to have safe transportation home accompanied by a responsible adult. If you have not arranged safe transportation home, your surgery will be cancelled. Safe transportation may include private vehicle, ride-share service, taxi and public transportation when accompanied by a responsible adult who will assist you home. A responsible adult is someone known to you and does not include the taxi, ride-share or public transit drive transporting you.    9.  If it is medically necessary for you to have a longer stay, you will be informed as soon as the decision is  made.    10. Only bring essential items to the hospital.  Do not wear or bring anything of value to the hospital including jewelry of any kind, money, or wallet. Do not wear make-up or contact lenses.  DO NOT BRING MEDICATIONS FROM HOME unless instructed to do so. DO bring your hearing aids, glasses, and a case    11. No lotion, creams, powders, or oils on skin the morning of procedure     12. Dress in comfortable clothes.    13.  If instructed, please bring a copy of your Advanced Directive (Living Will/Durable Power of ) on the day of your procedure.     14. Ensuring your safety at all times is a very important part of our [employer] [facility] Culture of Safety. After having surgery and sedation, you are at risk for falling and balance issues. Although you may feel awake, the effects of the medication can last up to 24 hours after anesthesia. If you need to use the bathroom during your recovery period, nursing staff will escort you there and stay with you to ensure your safety.    15. Refrain from drinking alcohol and smoking cigarettes for 24 hours prior to surgery.    16. Shower with antibacterial soap (Dial) the night before and morning of your procedure.  If your procedure indicates the need for CHG antiseptic wash (Bactoshield or Hibiclens), please use this instead and follow instructions as discussed at the time of your Pre-Admission Testing phone interview or visit.    Above instructions reviewed with patient and patient acknowledges understanding.    Form explained by: Tigist Lerma RN

## 2024-06-12 ENCOUNTER — ANESTHESIA EVENT (OUTPATIENT)
Dept: OPERATING ROOM | Facility: HOSPITAL | Age: 59
Setting detail: SURGERY ADMIT
DRG: 331 | End: 2024-06-12
Payer: COMMERCIAL

## 2024-06-17 ENCOUNTER — HOSPITAL ENCOUNTER (INPATIENT)
Facility: HOSPITAL | Age: 59
LOS: 4 days | Discharge: HOME | DRG: 331 | End: 2024-06-21
Attending: SURGERY | Admitting: SURGERY
Payer: COMMERCIAL

## 2024-06-17 ENCOUNTER — ANESTHESIA (OUTPATIENT)
Dept: OPERATING ROOM | Facility: HOSPITAL | Age: 59
Setting detail: SURGERY ADMIT
DRG: 331 | End: 2024-06-17
Payer: COMMERCIAL

## 2024-06-17 DIAGNOSIS — K57.32 DIVERTICULITIS LARGE INTESTINE W/O PERFORATION OR ABSCESS W/O BLEEDING: ICD-10-CM

## 2024-06-17 PROCEDURE — 27200000 HC STERILE SUPPLY: Performed by: SURGERY

## 2024-06-17 PROCEDURE — 63600000 HC DRUGS/DETAIL CODE: Performed by: ANESTHESIOLOGY

## 2024-06-17 PROCEDURE — 63600000 HC DRUGS/DETAIL CODE: Mod: JZ | Performed by: SURGERY

## 2024-06-17 PROCEDURE — 25800000 HC PHARMACY IV SOLUTIONS: Performed by: SURGERY

## 2024-06-17 PROCEDURE — 36000004 HC OR LEVEL 4 INITIAL 30MIN: Performed by: SURGERY

## 2024-06-17 PROCEDURE — C1769 GUIDE WIRE: HCPCS | Performed by: SURGERY

## 2024-06-17 PROCEDURE — 0DBN4ZZ EXCISION OF SIGMOID COLON, PERCUTANEOUS ENDOSCOPIC APPROACH: ICD-10-PCS | Performed by: SURGERY

## 2024-06-17 PROCEDURE — 27800000 HC SUPPLY/IMPLANTS: Performed by: SURGERY

## 2024-06-17 PROCEDURE — 12000000 HC ROOM AND CARE MED/SURG

## 2024-06-17 PROCEDURE — 25000000 HC PHARMACY GENERAL: Performed by: SURGERY

## 2024-06-17 PROCEDURE — 71000001 HC PACU PHASE 1 INITIAL 30MIN: Performed by: SURGERY

## 2024-06-17 PROCEDURE — 25800000 HC PHARMACY IV SOLUTIONS

## 2024-06-17 PROCEDURE — 71000011 HC PACU PHASE 1 EA ADDL MIN: Performed by: SURGERY

## 2024-06-17 PROCEDURE — 63600000 HC DRUGS/DETAIL CODE: Mod: JZ,JG | Performed by: SURGERY

## 2024-06-17 PROCEDURE — 25000000 HC PHARMACY GENERAL

## 2024-06-17 PROCEDURE — 88307 TISSUE EXAM BY PATHOLOGIST: CPT | Performed by: SURGERY

## 2024-06-17 PROCEDURE — 0T778DZ DILATION OF LEFT URETER WITH INTRALUMINAL DEVICE, VIA NATURAL OR ARTIFICIAL OPENING ENDOSCOPIC: ICD-10-PCS | Performed by: STUDENT IN AN ORGANIZED HEALTH CARE EDUCATION/TRAINING PROGRAM

## 2024-06-17 PROCEDURE — 37000001 HC ANESTHESIA GENERAL: Performed by: SURGERY

## 2024-06-17 PROCEDURE — 36000014 HC OR LEVEL 4 EA ADDL MIN: Performed by: SURGERY

## 2024-06-17 PROCEDURE — 63600000 HC DRUGS/DETAIL CODE: Mod: JZ

## 2024-06-17 PROCEDURE — C1758 CATHETER, URETERAL: HCPCS | Performed by: SURGERY

## 2024-06-17 PROCEDURE — 63700000 HC SELF-ADMINISTRABLE DRUG: Performed by: SURGERY

## 2024-06-17 PROCEDURE — 63600000 HC DRUGS/DETAIL CODE: Performed by: SURGERY

## 2024-06-17 RX ORDER — DEXTROSE 40 %
15-30 GEL (GRAM) ORAL AS NEEDED
Status: DISCONTINUED | OUTPATIENT
Start: 2024-06-17 | End: 2024-06-21 | Stop reason: HOSPADM

## 2024-06-17 RX ORDER — LIDOCAINE HYDROCHLORIDE 10 MG/ML
INJECTION, SOLUTION INFILTRATION; PERINEURAL AS NEEDED
Status: DISCONTINUED | OUTPATIENT
Start: 2024-06-17 | End: 2024-06-17 | Stop reason: SURG

## 2024-06-17 RX ORDER — DEXTROSE MONOHYDRATE, SODIUM CHLORIDE, AND POTASSIUM CHLORIDE 50; 1.49; 4.5 G/1000ML; G/1000ML; G/1000ML
INJECTION, SOLUTION INTRAVENOUS CONTINUOUS
Status: DISCONTINUED | OUTPATIENT
Start: 2024-06-17 | End: 2024-06-20

## 2024-06-17 RX ORDER — FENTANYL CITRATE 50 UG/ML
INJECTION, SOLUTION INTRAMUSCULAR; INTRAVENOUS AS NEEDED
Status: DISCONTINUED | OUTPATIENT
Start: 2024-06-17 | End: 2024-06-17 | Stop reason: SURG

## 2024-06-17 RX ORDER — ONDANSETRON HYDROCHLORIDE 2 MG/ML
4 INJECTION, SOLUTION INTRAVENOUS EVERY 8 HOURS PRN
Status: DISCONTINUED | OUTPATIENT
Start: 2024-06-17 | End: 2024-06-21 | Stop reason: HOSPADM

## 2024-06-17 RX ORDER — IBUPROFEN 200 MG
16-32 TABLET ORAL AS NEEDED
Status: DISCONTINUED | OUTPATIENT
Start: 2024-06-17 | End: 2024-06-21 | Stop reason: HOSPADM

## 2024-06-17 RX ORDER — DEXTROSE 50 % IN WATER (D50W) INTRAVENOUS SYRINGE
25 AS NEEDED
Status: DISCONTINUED | OUTPATIENT
Start: 2024-06-17 | End: 2024-06-21 | Stop reason: HOSPADM

## 2024-06-17 RX ORDER — IBUPROFEN 200 MG
16-32 TABLET ORAL AS NEEDED
Status: DISCONTINUED | OUTPATIENT
Start: 2024-06-17 | End: 2024-06-17 | Stop reason: HOSPADM

## 2024-06-17 RX ORDER — ALPRAZOLAM 0.5 MG/1
0.5 TABLET ORAL NIGHTLY
Status: DISCONTINUED | OUTPATIENT
Start: 2024-06-17 | End: 2024-06-21 | Stop reason: HOSPADM

## 2024-06-17 RX ORDER — GLYCOPYRROLATE 0.6MG/3ML
SYRINGE (ML) INTRAVENOUS AS NEEDED
Status: DISCONTINUED | OUTPATIENT
Start: 2024-06-17 | End: 2024-06-17 | Stop reason: SURG

## 2024-06-17 RX ORDER — ONDANSETRON HYDROCHLORIDE 2 MG/ML
INJECTION, SOLUTION INTRAVENOUS AS NEEDED
Status: DISCONTINUED | OUTPATIENT
Start: 2024-06-17 | End: 2024-06-17 | Stop reason: SURG

## 2024-06-17 RX ORDER — MEPERIDINE HYDROCHLORIDE 25 MG/ML
12.5 INJECTION INTRAMUSCULAR; INTRAVENOUS; SUBCUTANEOUS ONCE
Status: COMPLETED | OUTPATIENT
Start: 2024-06-17 | End: 2024-06-17

## 2024-06-17 RX ORDER — EPHEDRINE SULFATE 50 MG/ML
INJECTION, SOLUTION INTRAVENOUS AS NEEDED
Status: DISCONTINUED | OUTPATIENT
Start: 2024-06-17 | End: 2024-06-17 | Stop reason: SURG

## 2024-06-17 RX ORDER — MIDAZOLAM HYDROCHLORIDE 2 MG/2ML
INJECTION, SOLUTION INTRAMUSCULAR; INTRAVENOUS AS NEEDED
Status: DISCONTINUED | OUTPATIENT
Start: 2024-06-17 | End: 2024-06-17 | Stop reason: SURG

## 2024-06-17 RX ORDER — DEXAMETHASONE SODIUM PHOSPHATE 4 MG/ML
INJECTION, SOLUTION INTRA-ARTICULAR; INTRALESIONAL; INTRAMUSCULAR; INTRAVENOUS; SOFT TISSUE AS NEEDED
Status: DISCONTINUED | OUTPATIENT
Start: 2024-06-17 | End: 2024-06-17 | Stop reason: SURG

## 2024-06-17 RX ORDER — VALSARTAN 160 MG/1
160 TABLET ORAL DAILY
Status: DISCONTINUED | OUTPATIENT
Start: 2024-06-17 | End: 2024-06-21 | Stop reason: HOSPADM

## 2024-06-17 RX ORDER — SODIUM CHLORIDE, SODIUM GLUCONATE, SODIUM ACETATE, POTASSIUM CHLORIDE AND MAGNESIUM CHLORIDE 30; 37; 368; 526; 502 MG/100ML; MG/100ML; MG/100ML; MG/100ML; MG/100ML
INJECTION, SOLUTION INTRAVENOUS CONTINUOUS PRN
Status: DISCONTINUED | OUTPATIENT
Start: 2024-06-17 | End: 2024-06-17 | Stop reason: SURG

## 2024-06-17 RX ORDER — PROPOFOL 10 MG/ML
INJECTION, EMULSION INTRAVENOUS AS NEEDED
Status: DISCONTINUED | OUTPATIENT
Start: 2024-06-17 | End: 2024-06-17 | Stop reason: SURG

## 2024-06-17 RX ORDER — ROCURONIUM BROMIDE 10 MG/ML
INJECTION, SOLUTION INTRAVENOUS AS NEEDED
Status: DISCONTINUED | OUTPATIENT
Start: 2024-06-17 | End: 2024-06-17 | Stop reason: SURG

## 2024-06-17 RX ORDER — POTASSIUM CHLORIDE 20 MEQ/1
40 TABLET, EXTENDED RELEASE ORAL AS NEEDED
Status: DISCONTINUED | OUTPATIENT
Start: 2024-06-17 | End: 2024-06-21 | Stop reason: HOSPADM

## 2024-06-17 RX ORDER — METRONIDAZOLE 500 MG/100ML
500 INJECTION, SOLUTION INTRAVENOUS
Status: COMPLETED | OUTPATIENT
Start: 2024-06-17 | End: 2024-06-18

## 2024-06-17 RX ORDER — PANTOPRAZOLE SODIUM 40 MG/10ML
40 INJECTION, POWDER, LYOPHILIZED, FOR SOLUTION INTRAVENOUS EVERY 24 HOURS
Status: DISCONTINUED | OUTPATIENT
Start: 2024-06-17 | End: 2024-06-20

## 2024-06-17 RX ORDER — ONDANSETRON HYDROCHLORIDE 2 MG/ML
4 INJECTION, SOLUTION INTRAVENOUS ONCE
Status: COMPLETED | OUTPATIENT
Start: 2024-06-17 | End: 2024-06-17

## 2024-06-17 RX ORDER — GABAPENTIN 100 MG/1
200 CAPSULE ORAL 2 TIMES DAILY
Status: DISCONTINUED | OUTPATIENT
Start: 2024-06-17 | End: 2024-06-21 | Stop reason: HOSPADM

## 2024-06-17 RX ORDER — POTASSIUM CHLORIDE 20 MEQ/1
20 TABLET, EXTENDED RELEASE ORAL AS NEEDED
Status: DISCONTINUED | OUTPATIENT
Start: 2024-06-17 | End: 2024-06-21 | Stop reason: HOSPADM

## 2024-06-17 RX ORDER — DEXTROSE 50 % IN WATER (D50W) INTRAVENOUS SYRINGE
25 AS NEEDED
Status: DISCONTINUED | OUTPATIENT
Start: 2024-06-17 | End: 2024-06-17 | Stop reason: HOSPADM

## 2024-06-17 RX ORDER — DEXTROSE 40 %
15-30 GEL (GRAM) ORAL AS NEEDED
Status: DISCONTINUED | OUTPATIENT
Start: 2024-06-17 | End: 2024-06-17 | Stop reason: HOSPADM

## 2024-06-17 RX ORDER — BUPIVACAINE HCL/EPINEPHRINE 0.5-1:200K
VIAL (ML) INJECTION
Status: DISCONTINUED | OUTPATIENT
Start: 2024-06-17 | End: 2024-06-17 | Stop reason: HOSPADM

## 2024-06-17 RX ORDER — KETOROLAC TROMETHAMINE 15 MG/ML
15 INJECTION, SOLUTION INTRAMUSCULAR; INTRAVENOUS EVERY 6 HOURS PRN
Status: DISCONTINUED | OUTPATIENT
Start: 2024-06-17 | End: 2024-06-18

## 2024-06-17 RX ORDER — HYDROMORPHONE HYDROCHLORIDE 1 MG/ML
1 INJECTION, SOLUTION INTRAMUSCULAR; INTRAVENOUS; SUBCUTANEOUS EVERY 2 HOUR PRN
Status: DISCONTINUED | OUTPATIENT
Start: 2024-06-17 | End: 2024-06-21 | Stop reason: HOSPADM

## 2024-06-17 RX ORDER — HYDROMORPHONE HYDROCHLORIDE 1 MG/ML
0.5 INJECTION, SOLUTION INTRAMUSCULAR; INTRAVENOUS; SUBCUTANEOUS
Status: DISCONTINUED | OUTPATIENT
Start: 2024-06-17 | End: 2024-06-17 | Stop reason: HOSPADM

## 2024-06-17 RX ORDER — DIPHENHYDRAMINE HCL 50 MG/ML
25 VIAL (ML) INJECTION EVERY 6 HOURS PRN
Status: DISCONTINUED | OUTPATIENT
Start: 2024-06-17 | End: 2024-06-21 | Stop reason: HOSPADM

## 2024-06-17 RX ORDER — ACETAMINOPHEN 325 MG/1
650 TABLET ORAL EVERY 4 HOURS
Status: DISCONTINUED | OUTPATIENT
Start: 2024-06-17 | End: 2024-06-20

## 2024-06-17 RX ORDER — FENTANYL CITRATE 50 UG/ML
50 INJECTION, SOLUTION INTRAMUSCULAR; INTRAVENOUS EVERY 5 MIN PRN
Status: DISCONTINUED | OUTPATIENT
Start: 2024-06-17 | End: 2024-06-17 | Stop reason: HOSPADM

## 2024-06-17 RX ORDER — HYDROMORPHONE HYDROCHLORIDE 1 MG/ML
INJECTION, SOLUTION INTRAMUSCULAR; INTRAVENOUS; SUBCUTANEOUS AS NEEDED
Status: DISCONTINUED | OUTPATIENT
Start: 2024-06-17 | End: 2024-06-17 | Stop reason: SURG

## 2024-06-17 RX ORDER — HYDROCHLOROTHIAZIDE 25 MG/1
25 TABLET ORAL DAILY
Status: DISCONTINUED | OUTPATIENT
Start: 2024-06-17 | End: 2024-06-21 | Stop reason: HOSPADM

## 2024-06-17 RX ORDER — SODIUM CHLORIDE 9 MG/ML
INJECTION, SOLUTION INTRAVENOUS CONTINUOUS PRN
Status: DISCONTINUED | OUTPATIENT
Start: 2024-06-17 | End: 2024-06-17 | Stop reason: SURG

## 2024-06-17 RX ORDER — METRONIDAZOLE 500 MG/100ML
500 INJECTION, SOLUTION INTRAVENOUS ONCE
Status: COMPLETED | OUTPATIENT
Start: 2024-06-17 | End: 2024-06-17

## 2024-06-17 RX ADMIN — KETOROLAC TROMETHAMINE 15 MG: 15 INJECTION, SOLUTION INTRAMUSCULAR; INTRAVENOUS at 16:21

## 2024-06-17 RX ADMIN — ACETAMINOPHEN 650 MG: 325 TABLET, FILM COATED ORAL at 18:48

## 2024-06-17 RX ADMIN — SODIUM CHLORIDE, SODIUM GLUCONATE, SODIUM ACETATE, POTASSIUM CHLORIDE AND MAGNESIUM CHLORIDE: 526; 502; 368; 37; 30 INJECTION, SOLUTION INTRAVENOUS at 09:57

## 2024-06-17 RX ADMIN — SUGAMMADEX 200 MG: 100 INJECTION, SOLUTION INTRAVENOUS at 10:40

## 2024-06-17 RX ADMIN — POTASSIUM CHLORIDE, DEXTROSE MONOHYDRATE AND SODIUM CHLORIDE 100 ML/HR: 150; 5; 450 INJECTION, SOLUTION INTRAVENOUS at 11:26

## 2024-06-17 RX ADMIN — ACETAMINOPHEN 650 MG: 325 TABLET, FILM COATED ORAL at 14:29

## 2024-06-17 RX ADMIN — GLYCOPYRROLATE 0.2 MG: 0.2 INJECTION, SOLUTION INTRAMUSCULAR; INTRAVITREAL at 08:36

## 2024-06-17 RX ADMIN — ROCURONIUM BROMIDE 10 MG: 10 INJECTION INTRAVENOUS at 10:26

## 2024-06-17 RX ADMIN — CEFTRIAXONE SODIUM 1 G: 1 INJECTION, POWDER, FOR SOLUTION INTRAVENOUS at 07:45

## 2024-06-17 RX ADMIN — EPHEDRINE SULFATE 5 MG: 50 INJECTION, SOLUTION INTRAVENOUS at 10:18

## 2024-06-17 RX ADMIN — GLYCOPYRROLATE 0.1 MG: 0.2 INJECTION, SOLUTION INTRAMUSCULAR; INTRAVITREAL at 10:19

## 2024-06-17 RX ADMIN — ROCURONIUM BROMIDE 20 MG: 10 INJECTION INTRAVENOUS at 08:27

## 2024-06-17 RX ADMIN — METRONIDAZOLE 500 MG: 500 INJECTION, SOLUTION INTRAVENOUS at 06:40

## 2024-06-17 RX ADMIN — FENTANYL CITRATE 50 MCG: 50 INJECTION, SOLUTION INTRAMUSCULAR; INTRAVENOUS at 08:47

## 2024-06-17 RX ADMIN — PANTOPRAZOLE SODIUM 40 MG: 40 INJECTION, POWDER, LYOPHILIZED, FOR SOLUTION INTRAVENOUS at 14:29

## 2024-06-17 RX ADMIN — HYDROMORPHONE HYDROCHLORIDE 1 MG: 1 INJECTION, SOLUTION INTRAMUSCULAR; INTRAVENOUS; SUBCUTANEOUS at 18:48

## 2024-06-17 RX ADMIN — SODIUM CHLORIDE: 9 INJECTION, SOLUTION INTRAVENOUS at 07:30

## 2024-06-17 RX ADMIN — HYDROMORPHONE HYDROCHLORIDE 0.3 MG: 1 INJECTION, SOLUTION INTRAMUSCULAR; INTRAVENOUS; SUBCUTANEOUS at 09:04

## 2024-06-17 RX ADMIN — EPHEDRINE SULFATE 10 MG: 50 INJECTION, SOLUTION INTRAVENOUS at 08:35

## 2024-06-17 RX ADMIN — ACETAMINOPHEN 650 MG: 325 TABLET, FILM COATED ORAL at 20:09

## 2024-06-17 RX ADMIN — ONDANSETRON 4 MG: 2 INJECTION INTRAMUSCULAR; INTRAVENOUS at 12:41

## 2024-06-17 RX ADMIN — KETOROLAC TROMETHAMINE 15 MG: 15 INJECTION, SOLUTION INTRAMUSCULAR; INTRAVENOUS at 21:21

## 2024-06-17 RX ADMIN — POTASSIUM CHLORIDE, DEXTROSE MONOHYDRATE AND SODIUM CHLORIDE: 150; 5; 450 INJECTION, SOLUTION INTRAVENOUS at 23:00

## 2024-06-17 RX ADMIN — FENTANYL CITRATE 50 MCG: 50 INJECTION, SOLUTION INTRAMUSCULAR; INTRAVENOUS at 08:27

## 2024-06-17 RX ADMIN — ROCURONIUM BROMIDE 50 MG: 10 INJECTION INTRAVENOUS at 07:39

## 2024-06-17 RX ADMIN — METRONIDAZOLE 500 MG: 500 INJECTION, SOLUTION INTRAVENOUS at 16:21

## 2024-06-17 RX ADMIN — HYDROMORPHONE HYDROCHLORIDE 0.2 MG: 1 INJECTION, SOLUTION INTRAMUSCULAR; INTRAVENOUS; SUBCUTANEOUS at 10:50

## 2024-06-17 RX ADMIN — HYDROCHLOROTHIAZIDE 25 MG: 25 TABLET ORAL at 14:29

## 2024-06-17 RX ADMIN — ALPRAZOLAM 0.5 MG: 0.5 TABLET ORAL at 21:21

## 2024-06-17 RX ADMIN — ONDANSETRON 4 MG: 2 INJECTION INTRAMUSCULAR; INTRAVENOUS at 10:46

## 2024-06-17 RX ADMIN — HYDROMORPHONE HYDROCHLORIDE 0.2 MG: 1 INJECTION, SOLUTION INTRAMUSCULAR; INTRAVENOUS; SUBCUTANEOUS at 09:18

## 2024-06-17 RX ADMIN — MEPERIDINE HYDROCHLORIDE 12.5 MG: 25 INJECTION INTRAMUSCULAR; INTRAVENOUS; SUBCUTANEOUS at 12:27

## 2024-06-17 RX ADMIN — VALSARTAN 160 MG: 160 TABLET, FILM COATED ORAL at 14:29

## 2024-06-17 RX ADMIN — ROCURONIUM BROMIDE 20 MG: 10 INJECTION INTRAVENOUS at 09:28

## 2024-06-17 RX ADMIN — HYDROMORPHONE HYDROCHLORIDE 1 MG: 1 INJECTION, SOLUTION INTRAMUSCULAR; INTRAVENOUS; SUBCUTANEOUS at 22:53

## 2024-06-17 RX ADMIN — PROPOFOL 200 MG: 10 INJECTION, EMULSION INTRAVENOUS at 07:38

## 2024-06-17 RX ADMIN — HYDROMORPHONE HYDROCHLORIDE 0.3 MG: 1 INJECTION, SOLUTION INTRAMUSCULAR; INTRAVENOUS; SUBCUTANEOUS at 10:41

## 2024-06-17 RX ADMIN — FENTANYL CITRATE 50 MCG: 50 INJECTION, SOLUTION INTRAMUSCULAR; INTRAVENOUS at 11:10

## 2024-06-17 RX ADMIN — DEXAMETHASONE SODIUM PHOSPHATE 4 MG: 4 INJECTION, SOLUTION INTRA-ARTICULAR; INTRALESIONAL; INTRAMUSCULAR; INTRAVENOUS; SOFT TISSUE at 07:53

## 2024-06-17 RX ADMIN — FENTANYL CITRATE 50 MCG: 50 INJECTION, SOLUTION INTRAMUSCULAR; INTRAVENOUS at 07:38

## 2024-06-17 RX ADMIN — MIDAZOLAM 2 MG: 1 INJECTION INTRAMUSCULAR; INTRAVENOUS at 07:30

## 2024-06-17 RX ADMIN — METRONIDAZOLE 500 MG: 500 INJECTION, SOLUTION INTRAVENOUS at 22:55

## 2024-06-17 RX ADMIN — LIDOCAINE HYDROCHLORIDE 10 ML: 10 INJECTION, SOLUTION INFILTRATION; PERINEURAL at 07:37

## 2024-06-17 ASSESSMENT — COGNITIVE AND FUNCTIONAL STATUS - GENERAL
WALKING IN HOSPITAL ROOM: 4 - NONE
CLIMB 3 TO 5 STEPS WITH RAILING: 4 - NONE
MOVING TO AND FROM BED TO CHAIR: 4 - NONE
CLIMB 3 TO 5 STEPS WITH RAILING: 3 - A LITTLE
MOVING TO AND FROM BED TO CHAIR: 3 - A LITTLE
STANDING UP FROM CHAIR USING ARMS: 4 - NONE
STANDING UP FROM CHAIR USING ARMS: 3 - A LITTLE
WALKING IN HOSPITAL ROOM: 3 - A LITTLE

## 2024-06-17 ASSESSMENT — PAIN SCALES - GENERAL: PAIN_LEVEL: 0

## 2024-06-17 NOTE — ANESTHESIA POSTPROCEDURE EVALUATION
Patient: Ino Carrion    Procedure Summary       Date: 06/17/24 Room / Location:  OR 3 / PH OR    Anesthesia Start: 0730 Anesthesia Stop: 1116    Procedures:       LAPAROSCOPIC ASSISTED SIGMOID COLECTOMY, LIGHT STENTS (Abdomen)      Left Ureteral Lighted Stent Placement (Left: Urethra) Diagnosis:       Diverticulitis large intestine w/o perforation or abscess w/o bleeding      (Diverticulitis k57.32)    Surgeons: Cooper Hastings MD; Mac Devlin MD Responsible Provider: Senthil Sanders MD    Anesthesia Type: general ASA Status: 2            Anesthesia Type: general  PACU Vitals  6/17/2024 1111 - 6/17/2024 1211        6/17/2024  1115 6/17/2024  1130 6/17/2024  1145 6/17/2024  1200    BP: 138/90 144/74 142/72 163/89    Temp: 36.5 °C (97.7 °F) -- -- --    Pulse: 82 74 74 74    Resp: 10 13 12 19    SpO2: 95 % 97 % 99 % 99 %              Anesthesia Post Evaluation    Pain score: 0  Pain management: adequate  Mode of pain management: IV medication  Patient location during evaluation: PACU  Patient participation: complete - patient participated  Level of consciousness: awake and alert  Cardiovascular status: acceptable  Airway Patency: adequate  Respiratory status: acceptable  Hydration status: acceptable  Anesthetic complications: no

## 2024-06-17 NOTE — PLAN OF CARE
Care Coordination Admission Assessment Note    General Information:  Readmission Within the last 30 days: no previous admission in last 30 days  Does patient have a : No  Patient-Specific Goals (include timeframe): Return home w/spouse once medically cleared    Living Arrangements:  Arrived From: home  Current Living Arrangements: home  People in Home: spouse  Home Accessibility: stairs to enter home (Group), stairs within home (Group)  Living Arrangement Comments: [facility]SH w/FERNANDO. lives w/spouse Jimena.    Housing Stability and Utility Access (SDOH):  In the last 12 months, was there a time when you were not able to pay the mortgage or rent on time?: No  In the last 12 months, how many places have you lived?:    In the last 12 months, was there a time when you did not have a steady place to sleep or slept in a shelter (including now)?: No  In the past 12 months has the electric, gas, oil, or water company threatened to shut off services in your home?: No    Functional Status Prior to Admission:   Assistive Device/Animal Currently Used at Home: none  Functional Status Comments: ambulatory w/out DME, independent w/ADLs  IADL Comments: independent w/IADLs, drives. not a  .     Supports and Services:  Current Outpatient/Agency/Support Group: none  Type of Current Home Care Services:    History of home care episode or rehab stay: none    Discharge Needs Assessment:   Concerns to be Addressed: discharge planning, care coordination/care conferences  Current Discharge Risk:    Anticipated Changes Related to Illness: none    Patient/Family Anticipated Discharge Plan:  Patient/Family Anticipates Transition To: home  Patient/Family Anticipated Services at Transition: none    Connection to Community  Patient declined offered resources.    Patient Choice:   Offered/Gave Vendor List: no  Patient's Choice of Community Agency(s): n/a       Anticipated Discharge Plan:  Met with patient. Provided education  and contact information for Care Coordination services.: yes (and spouse Jimena at bedside)  Anticipated Discharge Disposition: home with assistance     Transportation Needs (SDOH):  Transportation Concerns: none  Transportation Anticipated: family or friend will provide  Is Out of Hospital DNR needed at discharge?: no    In the past 12 months, has lack of transportation kept you from medical appointments or from getting medications?: No  In the past 12 months, has lack of transportation kept you from meetings, work, or from getting things needed for daily living?: No    Concerns - comments:  Confirmed that demographics, insurance coverage, emergency contacts, PCP & preferred pharmacy are accurate as recorded in Epic. Will continue to follow for care coordination and DC planning needs.

## 2024-06-17 NOTE — OP NOTE
OPERATIVE PROCEDURE NOTE    PATIENT NAME: Ino Carrion   MRN: [id number]  : 1965  DATE OF PROCEDURE: 2024      PRE-OPERATIVE DIAGNOSIS:   Intraoperative identification of anatomy      POST-OPERATIVE DIAGNOSIS:   Same      PROCEDURE:   Cystoscopy, placement of Left lighted ureteral stents    SURGEON:   Mac Devlin MD      ANESTHESIA: General endotracheal anesthesia      INDICATIONS:  Ino Carrion is a 58 y.o. male who is to undergo a colon resection with Dr. Hastings.  Ureteral stents are being placed at their request for intraoperative ureteral identification.    PROCEDURE IN DETAIL:  Following wristband identification and written and verbal consent the patient was taken to the operating room and placed on the operating room table in the supine position. Pneumatic compression stockings were placed. Antibiotics were given intravenously preoperatively per Dr. Hastings. General anesthesia was then induced. The patient was placed into the relaxed dorsolithotomy position and prepped and draped in usual sterile fashion.    A 22 Lithuanian cystoscope was passed into the patient's bladder per urethra. Cystoscopy pan endoscopy was performed and the findings were as follows: The urethra appeared within normal limits, the bladder outlet was notable for trilobar hyperplasia with significant friability, the ureteral orifices appeared to be normal in position with normal morphology, trabeculation was 2 out of 4, bladder capacity appeared adequate, and the bladder mucosa appeared within normal limits. Attention was then turned to the left ureteral orifice.    A 6 Lithuanian open-ended lighted ureteral stent was passed into the patient's left ureteral orifice, having passed a 0.038 sensor wire through the ureteral stent.  The stent was advanced under direct vision to the level of the left renal pelvis. Clear urine was seen emanating from the stent.    The cystoscope was then removed leaving the ureteral catheter in  place. A 16 Uzbek Caceres catheter was then inserted alongside the ureteral catheter. The stent was secured to the Caceres. The lighted filament was subsequently inserted into the ureteral catheter.    The procedure was then complete. The patient tolerated this well. He was ready for Dr. Hastings at the conclusion of this.    CONDITION:  Stable      ESTIMATED BLOOD LOSS:  No blood loss documented.      COMPLICATIONS:  None          DRAINS:  Caceres catheter, Ureteral Stents             Mac Devlin MD  6/17/2024  8:18 AM

## 2024-06-17 NOTE — OP NOTE
Preoperative diagnosis: Chronic recurrent sigmoid diverticulitis    Postoperative diagnosis: Same    Procedure: Laparoscopic assisted sigmoid colectomy    Surgeon: Venkata    Anesthesia: General endotracheal tube    Estimated blood loss: Minimal    Drains: None    Specimen: Sigmoid colon and anastomotic rings    Findings: Above      Technique:    The patient was placed on the OR table supine position and the abdomen prepped and draped as well as the perineum.  A supraumbilical stab wound was made with 11 blade carried down through the fascia under direct vision and the Delaney trocar established.  The abdomen was insufflated 3 additional ports were established two 5 mm and 112 mm right lower quadrant port.  These were established under direct vision.  The sigmoid colon was adherent to the anterior abdominal wall in the area of the chronic recurrent inflammation.  This was taken down bluntly and the sigmoid colon then mobilized laterally and mobilized off the retroperitoneum.  The ureter was clearly visualized and preserved throughout.  Mobilization was carried up to include the splenic flexure of the colon.  The mesentery to the sigmoid colon was then taken down using the LigaSure device into the pelvis to the area of the rectosigmoid junction.  Once completely cleared of its fatty tissue the rectosigmoid was divided with the endoscopic reticulating 60 mm stapler purple load.  We then identified the portion of the planned transection of the descending sigmoid junction and cleared this of its associated mesenteric fatty tissue and vascularity using the LigaSure device.  Once completely mobilized and clear we ascertained that it could easily reach the rectal stump and then perform the open portion of the procedure where a small Pfannenstiel incision measuring by roughly 6 to 8 cm was made transversely in the lower abdomen carried down through the anterior rectus fascia and the peritoneum then opened and the specimen  retrieved.  This was removed all the way to the area of planned transection at which point we placed the automatic pursestring device and amputated the colon with the curved Mercado scissors.  The sizing and dilatation of the descending colon was then done to determine that the 28 mm stapling device would be the perfect size.  The anvil was placed into the descending colon and the pursestring tied down.  This was returned to the pelvis and the Pfannenstiel incision closed with a running chromic in the peritoneum followed by a running 0 Maxon in the anterior rectus fascia.  The wound was irrigated and the abdomen reinsufflated.  We then dilated the rectum sequentially and placed the end-to-end anastomosis and 28 mm stapler deployed it and perform the anastomosis under direct vision laparoscopically.  2 perfect anastomotic rings were retrieved from the stapler and the anastomosis was tested under saline and found to be airtight.  The right lower quadrant 12 mm port site was then closed with the Eliezer-Oumou device.  All ports and instruments were removed the abdomen desufflated and the supraumbilical fascial defect closed with figure-of-eight 0 Vicryl sutures.  Skin incisions were closed in the usual fashion with a running 3-0 Monocryl and the Pfannenstiel incision after injection of local anesthetic and irrigation of all incisions.  Dressings and ice packs were placed and the patient was awakened extubated and taken to the recovery area in good condition.

## 2024-06-17 NOTE — PLAN OF CARE
Problem: Adult Inpatient Plan of Care  Goal: Plan of Care Review  Outcome: Progressing  Flowsheets (Taken 6/17/2024 2906)  Progress: improving  Outcome Evaluation: Patient received from PACU. Incision sites x4 CDI. Bishop patent draining yellow urine. Patient report possible leakage around bishop, provider notified. Minimal drainage from what nurse could seen and patient educated to let nurse know if it happens again and next steps would be taken to evaluate bishop. Urine is draining into bishop bag. VSS. Patient up standing and ambulated a few steps in room. Complaint of HA that patient says he has had since Saturday prior to admission. patient is on scheduled tylenol. Given PRN Toradol for pain, instructed to let nurse know if he has no relief and that IV dilaudid is available. Bishop to be d/c tomorrow. Oriented to room. No further needs at this time. Call bell within reach.  Plan of Care Reviewed With: patient

## 2024-06-17 NOTE — OR SURGEON
Pre-Procedure patient identification:  I am the primary operating surgeon/proceduralist and I have reviewed the applicable pathology reports and radiology studies for this procedure. I have identified the patient on 06/17/24 at 7:25 AM Cooper Hastings MD  Phone Number: [phone]

## 2024-06-17 NOTE — OR SURGEON
Pre-Procedure patient identification:  I am the primary operating surgeon/proceduralist and I have reviewed the applicable pathology reports and radiology studies for this procedure. I have identified the patient and confirmed laterality is left on 06/17/24 at 7:23 AM Cooper Hastings MD  Phone Number: [phone]

## 2024-06-17 NOTE — ANESTHESIA PREPROCEDURE EVALUATION
Relevant Problems   No relevant active problems       Anesthesia ROS/MED HX    Anesthesia History - neg  Pulmonary - neg  Neuro/Psych - neg  Cardiovascular   hypertension   ECG reviewed  Hematological - neg  Musculoskeletal- neg  Renal Disease- neg  Endo/Other- neg  ROS/MED HX Comments:    GI/Hepatic/Renal: Diverticulitis        HPI: 58M here for a lap sigmoid colectomy.     PMH:  Afib  HTN  HLD     Normal sinus rhythm   Normal ECG     Allergies:   Allergies   Allergen Reactions    Iodinated Contrast Media     Amoxicillin Rash       ROS: Denies any chest pain or shortness of breath    PMH:   Past Medical History:   Diagnosis Date    Atrial fibrillation (CMS/HCC)     Ablation done Dr. Ramesh cardiologist    Diverticulitis of colon     3 hospital admissions in 9 months 7/23,11/23,2/24    Hypertension     Kidney stone     Lipid disorder     Psoriasis (a type of skin inflammation)     Being treated for possible plaque psoriasis       PSH:   Past Surgical History:   Procedure Laterality Date    BACK SURGERY      C5-6 herniated disc 12 years ago    CARDIAC ELECTROPHYSIOLOGY MAPPING AND ABLATION      KNEE ARTHROSCOPY W/ ACL RECONSTRUCTION Right     4 surgeries in the past    LITHOTRIPSY      TONSILLECTOMY         No current facility-administered medications on file prior to encounter.     Current Outpatient Medications on File Prior to Encounter   Medication Sig    ALPRAZolam (XANAX) 0.5 mg tablet Take 0.5 mg by mouth nightly. for sleep    metFORMIN XR (GLUCOPHAGE-XR) 500 mg 24 hr tablet Take 500 mg by mouth daily.    risankizumab-rzaa (SKYRIZI SUBQ) Inject under the skin. 4 times/yearly    rosuvastatin (CRESTOR) 10 mg tablet daily.    valsartan-hydrochlorothiazide (DIOVAN-HCT) 160-25 mg per tablet Take 1 tablet by mouth daily.       CBC Results         05/20/24 02/27/24 02/26/24     1337 0659 1426    WBC 6.86 9.21 15.10    RBC 5.41 4.77 5.61    HGB 16.4 14.4 17.0    HCT 48.6 42.9 49.6    MCV 89.8 89.9 88.4    MCH 30.3  30.2 30.3    MCHC 33.7 33.6 34.3     227 268           Comment for HGB at 0659 on 02/27/24: ALL RESULTS HAVE BEEN CHECKEDSPECIMEN QUALITY CHECKED          BMP Results         05/20/24 02/27/24 02/26/24     1337 0659 1426     136 138    K 4.1 4.2 3.6    Cl 104 103 103    CO2 26 28 27    Glucose 127 110 123    BUN 23 18 17    Creatinine 0.9 0.9 0.9    Calcium 9.8 8.7 9.6    Anion Gap 10 5 8    EGFR >60.0 >60.0 >60.0           Comment for K at 1426 on 02/26/24: Results obtained on plasma. Plasma Potassium values may be up to 0.4 mEQ/L less than serum values. The differences may be greater for patients with high platelet or white cell counts.    Comment for EGFR at 1337 on 05/20/24: Calculation based on the Chronic Kidney Disease Epidemiology Collaboration (CKD-EPI) equation refit without adjustment for race.    Comment for EGFR at 0659 on 02/27/24: Calculation based on the Chronic Kidney Disease Epidemiology Collaboration (CKD-EPI) equation refit without adjustment for race.    Comment for EGFR at 1426 on 02/26/24: Calculation based on the Chronic Kidney Disease Epidemiology Collaboration (CKD-EPI) equation refit without adjustment for race.                    Physical Exam    Airway   Mallampati: II   TM distance: >3 FB   Neck ROM: full  Cardiovascular - normal   Rhythm: regular   Rate: normalPulmonary - normal   clear to auscultation  Dental - normal        Anesthesia Plan    Plan: general    Technique: general endotracheal      Airway: direct visual laryngoscopy    2 ASA  Anesthetic plan and risks discussed with: patient  Induction:    intravenous   Postop Plan:   Patient Disposition: inpatient floor planned admission  Comments:    Plan: GETA

## 2024-06-17 NOTE — ANESTHESIOLOGIST PRE-PROCEDURE ATTESTATION
Pre-Procedure Patient Identification:  I am the Primary Anesthesiologist and have identified the patient on 06/17/24 at 7:12 AM.   I have confirmed the procedure(s) will be performed by the following surgeon/proceduralist Cooper Hastings MD.

## 2024-06-17 NOTE — ANESTHESIA PROCEDURE NOTES
Airway  Urgency: elective    Start Time: 6/17/2024 7:41 AM  Airway not difficult    General Information and Staff    Patient location during procedure: OR  Anesthesiologist: Senthil Sanders MD  Resident/CRNA: Jackelin Sarmiento CRNA  Performed: resident/CRNA   Performed by: Jackelin Sarmiento CRNA  Authorized by: Senthil Sanders MD      Indications and Patient Condition  Indications for airway management: anesthesia  Sedation level: general  Preoxygenated: yes  Patient position: sniffing  Mask difficulty assessment: 2 - vent by mask + OA or adjuvant +/- NMBA    Final Airway Details  Final airway type: endotracheal airway      Successful airway: ETT  Cuffed: yes   Successful intubation technique: video laryngoscopy  Facilitating devices/methods: intubating stylet  Endotracheal tube insertion site: oral  Blade: Manasa  Blade size: #4  ETT size (mm): 7.5  Cormack-Lehane Classification: grade I - full view of glottis  Placement verified by: chest auscultation and capnometry   Measured from: lips  ETT to lips (cm): 24  Number of attempts at approach: 1  Number of other approaches attempted: 0  Atraumatic airway insertion

## 2024-06-18 PROBLEM — I10 PRIMARY HYPERTENSION: Status: ACTIVE | Noted: 2024-06-18

## 2024-06-18 PROBLEM — D72.829 LEUKOCYTOSIS: Status: ACTIVE | Noted: 2024-06-18

## 2024-06-18 PROBLEM — M54.50 ACUTE RIGHT-SIDED LOW BACK PAIN WITHOUT SCIATICA: Status: ACTIVE | Noted: 2024-06-18

## 2024-06-18 PROBLEM — E78.2 MIXED HYPERLIPIDEMIA: Status: ACTIVE | Noted: 2024-06-18

## 2024-06-18 PROBLEM — Z87.19 H/O DIVERTICULITIS OF COLON: Status: ACTIVE | Noted: 2024-06-17

## 2024-06-18 LAB
ANION GAP SERPL CALC-SCNC: 7 MEQ/L (ref 3–15)
BASOPHILS # BLD: 0.02 K/UL (ref 0.01–0.1)
BASOPHILS NFR BLD: 0.2 %
BUN SERPL-MCNC: 17 MG/DL (ref 7–25)
CALCIUM SERPL-MCNC: 8.8 MG/DL (ref 8.6–10.3)
CASE RPRT: NORMAL
CHLORIDE SERPL-SCNC: 104 MEQ/L (ref 98–107)
CLINICAL INFO: NORMAL
CO2 SERPL-SCNC: 27 MEQ/L (ref 21–31)
CREAT SERPL-MCNC: 1.1 MG/DL (ref 0.7–1.3)
DIFFERENTIAL METHOD BLD: ABNORMAL
EGFRCR SERPLBLD CKD-EPI 2021: >60 ML/MIN/1.73M*2
EOSINOPHIL # BLD: 0.02 K/UL (ref 0.04–0.54)
EOSINOPHIL NFR BLD: 0.2 %
ERYTHROCYTE [DISTWIDTH] IN BLOOD BY AUTOMATED COUNT: 13 % (ref 11.6–14.4)
ERYTHROCYTE [DISTWIDTH] IN BLOOD BY AUTOMATED COUNT: 13.2 % (ref 11.6–14.4)
GLUCOSE SERPL-MCNC: 158 MG/DL (ref 70–99)
HCT VFR BLD AUTO: 39.9 % (ref 40.1–51)
HCT VFR BLD AUTO: 41.1 % (ref 40.1–51)
HGB BLD-MCNC: 13.6 G/DL (ref 13.7–17.5)
HGB BLD-MCNC: 14.1 G/DL (ref 13.7–17.5)
IMM GRANULOCYTES # BLD AUTO: 0.04 K/UL (ref 0–0.08)
IMM GRANULOCYTES NFR BLD AUTO: 0.4 %
LYMPHOCYTES # BLD: 1.96 K/UL (ref 1.2–3.5)
LYMPHOCYTES NFR BLD: 17.6 %
MCH RBC QN AUTO: 30.4 PG (ref 28–33.2)
MCH RBC QN AUTO: 30.5 PG (ref 28–33.2)
MCHC RBC AUTO-ENTMCNC: 34.1 G/DL (ref 32.2–36.5)
MCHC RBC AUTO-ENTMCNC: 34.3 G/DL (ref 32.2–36.5)
MCV RBC AUTO: 88.8 FL (ref 83–98)
MCV RBC AUTO: 89.3 FL (ref 83–98)
MONOCYTES # BLD: 1.1 K/UL (ref 0.3–1)
MONOCYTES NFR BLD: 9.9 %
NEUTROPHILS # BLD: 8 K/UL (ref 1.7–7)
NEUTS SEG NFR BLD: 71.7 %
NRBC BLD-RTO: 0 %
PATH REPORT.FINAL DX SPEC: NORMAL
PATH REPORT.FINAL DX SPEC: NORMAL
PATH REPORT.GROSS SPEC: NORMAL
PDW BLD AUTO: 9.2 FL (ref 9.4–12.4)
PDW BLD AUTO: 9.9 FL (ref 9.4–12.4)
PLATELET # BLD AUTO: 219 K/UL (ref 150–350)
PLATELET # BLD AUTO: 252 K/UL (ref 150–350)
POTASSIUM SERPL-SCNC: 4 MEQ/L (ref 3.5–5.1)
RBC # BLD AUTO: 4.47 M/UL (ref 4.5–5.8)
RBC # BLD AUTO: 4.63 M/UL (ref 4.5–5.8)
SODIUM SERPL-SCNC: 138 MEQ/L (ref 136–145)
WBC # BLD AUTO: 11.14 K/UL (ref 3.8–10.5)
WBC # BLD AUTO: 12.51 K/UL (ref 3.8–10.5)

## 2024-06-18 PROCEDURE — 63700000 HC SELF-ADMINISTRABLE DRUG: Performed by: NURSE PRACTITIONER

## 2024-06-18 PROCEDURE — 63700000 HC SELF-ADMINISTRABLE DRUG: Performed by: SURGERY

## 2024-06-18 PROCEDURE — 99221 1ST HOSP IP/OBS SF/LOW 40: CPT | Performed by: HOSPITALIST

## 2024-06-18 PROCEDURE — 85027 COMPLETE CBC AUTOMATED: CPT | Performed by: SURGERY

## 2024-06-18 PROCEDURE — 85025 COMPLETE CBC W/AUTO DIFF WBC: CPT | Performed by: SURGERY

## 2024-06-18 PROCEDURE — 63700000 HC SELF-ADMINISTRABLE DRUG

## 2024-06-18 PROCEDURE — 80048 BASIC METABOLIC PNL TOTAL CA: CPT | Performed by: SURGERY

## 2024-06-18 PROCEDURE — 25000000 HC PHARMACY GENERAL: Performed by: SURGERY

## 2024-06-18 PROCEDURE — 36415 COLL VENOUS BLD VENIPUNCTURE: CPT | Performed by: SURGERY

## 2024-06-18 PROCEDURE — 25800000 HC PHARMACY IV SOLUTIONS: Performed by: SURGERY

## 2024-06-18 PROCEDURE — 12000000 HC ROOM AND CARE MED/SURG

## 2024-06-18 PROCEDURE — 63700000 HC SELF-ADMINISTRABLE DRUG: Performed by: HOSPITALIST

## 2024-06-18 PROCEDURE — 63600000 HC DRUGS/DETAIL CODE: Mod: JZ,JG | Performed by: SURGERY

## 2024-06-18 RX ORDER — LIDOCAINE 560 MG/1
1 PATCH PERCUTANEOUS; TOPICAL; TRANSDERMAL DAILY
Status: DISCONTINUED | OUTPATIENT
Start: 2024-06-18 | End: 2024-06-21 | Stop reason: HOSPADM

## 2024-06-18 RX ORDER — CALCIUM CARBONATE 200(500)MG
1000 TABLET,CHEWABLE ORAL 2 TIMES DAILY PRN
Status: DISCONTINUED | OUTPATIENT
Start: 2024-06-18 | End: 2024-06-21 | Stop reason: HOSPADM

## 2024-06-18 RX ORDER — IBUPROFEN/PSEUDOEPHEDRINE HCL 200MG-30MG
3 TABLET ORAL NIGHTLY
Status: DISCONTINUED | OUTPATIENT
Start: 2024-06-18 | End: 2024-06-18

## 2024-06-18 RX ORDER — CYCLOBENZAPRINE HCL 5 MG
10 TABLET ORAL
Status: DISCONTINUED | OUTPATIENT
Start: 2024-06-18 | End: 2024-06-21 | Stop reason: HOSPADM

## 2024-06-18 RX ORDER — DIPHENHYDRAMINE HCL 25 MG
25 CAPSULE ORAL ONCE
Status: COMPLETED | OUTPATIENT
Start: 2024-06-18 | End: 2024-06-18

## 2024-06-18 RX ORDER — IBUPROFEN/PSEUDOEPHEDRINE HCL 200MG-30MG
3 TABLET ORAL NIGHTLY
Status: DISCONTINUED | OUTPATIENT
Start: 2024-06-18 | End: 2024-06-21 | Stop reason: HOSPADM

## 2024-06-18 RX ADMIN — HYDROMORPHONE HYDROCHLORIDE 1 MG: 1 INJECTION, SOLUTION INTRAMUSCULAR; INTRAVENOUS; SUBCUTANEOUS at 01:15

## 2024-06-18 RX ADMIN — Medication 3 MG: at 00:34

## 2024-06-18 RX ADMIN — ACETAMINOPHEN 650 MG: 325 TABLET, FILM COATED ORAL at 09:06

## 2024-06-18 RX ADMIN — CALCIUM CARBONATE (ANTACID) CHEW TAB 500 MG 1000 MG: 500 CHEW TAB at 00:34

## 2024-06-18 RX ADMIN — GABAPENTIN 200 MG: 100 CAPSULE ORAL at 21:54

## 2024-06-18 RX ADMIN — ALPRAZOLAM 0.5 MG: 0.5 TABLET ORAL at 21:49

## 2024-06-18 RX ADMIN — HYDROMORPHONE HYDROCHLORIDE 1 MG: 1 INJECTION, SOLUTION INTRAMUSCULAR; INTRAVENOUS; SUBCUTANEOUS at 06:20

## 2024-06-18 RX ADMIN — CYCLOBENZAPRINE HYDROCHLORIDE 10 MG: 5 TABLET, FILM COATED ORAL at 21:49

## 2024-06-18 RX ADMIN — HYDROMORPHONE HYDROCHLORIDE 1 MG: 1 INJECTION, SOLUTION INTRAMUSCULAR; INTRAVENOUS; SUBCUTANEOUS at 22:38

## 2024-06-18 RX ADMIN — ACETAMINOPHEN 650 MG: 325 TABLET, FILM COATED ORAL at 13:35

## 2024-06-18 RX ADMIN — HYDROMORPHONE HYDROCHLORIDE 1 MG: 1 INJECTION, SOLUTION INTRAMUSCULAR; INTRAVENOUS; SUBCUTANEOUS at 17:27

## 2024-06-18 RX ADMIN — CEFTRIAXONE SODIUM 1 G: 1 INJECTION, POWDER, FOR SOLUTION INTRAMUSCULAR; INTRAVENOUS at 09:05

## 2024-06-18 RX ADMIN — CYCLOBENZAPRINE HYDROCHLORIDE 10 MG: 5 TABLET, FILM COATED ORAL at 09:06

## 2024-06-18 RX ADMIN — LIDOCAINE 1 PATCH: 4 PATCH TOPICAL at 10:56

## 2024-06-18 RX ADMIN — GABAPENTIN 200 MG: 100 CAPSULE ORAL at 09:06

## 2024-06-18 RX ADMIN — HYDROCHLOROTHIAZIDE 25 MG: 25 TABLET ORAL at 09:06

## 2024-06-18 RX ADMIN — DIPHENHYDRAMINE HYDROCHLORIDE 25 MG: 25 CAPSULE ORAL at 23:27

## 2024-06-18 RX ADMIN — PANTOPRAZOLE SODIUM 40 MG: 40 INJECTION, POWDER, LYOPHILIZED, FOR SOLUTION INTRAVENOUS at 09:05

## 2024-06-18 RX ADMIN — HYDROMORPHONE HYDROCHLORIDE 1 MG: 1 INJECTION, SOLUTION INTRAMUSCULAR; INTRAVENOUS; SUBCUTANEOUS at 14:35

## 2024-06-18 RX ADMIN — METRONIDAZOLE 500 MG: 500 INJECTION, SOLUTION INTRAVENOUS at 14:35

## 2024-06-18 RX ADMIN — Medication 3 MG: at 21:49

## 2024-06-18 RX ADMIN — METRONIDAZOLE 500 MG: 500 INJECTION, SOLUTION INTRAVENOUS at 06:24

## 2024-06-18 RX ADMIN — VALSARTAN 160 MG: 160 TABLET, FILM COATED ORAL at 09:06

## 2024-06-18 ASSESSMENT — COGNITIVE AND FUNCTIONAL STATUS - GENERAL
CLIMB 3 TO 5 STEPS WITH RAILING: 3 - A LITTLE
WALKING IN HOSPITAL ROOM: 4 - NONE
MOVING TO AND FROM BED TO CHAIR: 4 - NONE
STANDING UP FROM CHAIR USING ARMS: 4 - NONE
MOVING TO AND FROM BED TO CHAIR: 4 - NONE
WALKING IN HOSPITAL ROOM: 4 - NONE
STANDING UP FROM CHAIR USING ARMS: 4 - NONE
CLIMB 3 TO 5 STEPS WITH RAILING: 3 - A LITTLE

## 2024-06-18 NOTE — ASSESSMENT & PLAN NOTE
Right low back musculoskeletal pain-no radiation to the leg-improving  Continue Lidoderm, gabapentin, Flexeril   Continue Toradol and Dilaudid as needed

## 2024-06-18 NOTE — PLAN OF CARE
Plan of Care Review  Plan of Care Reviewed With: patient  Progress: improving  Outcome Evaluation: Patient aaox4, ambulating to bathroom with supervision, abd dressing-CDI, pt c/o abdominal & back pain- given prn medications, bishop draining clear yellow urine, pt with 4 bright red bloody stools- MD made aware, IVF running per order, Iv antibiotics given per order, VSS on RA, fall precautions in place- bed alarm on, call light within reach.

## 2024-06-18 NOTE — PROGRESS NOTES
POD#1 sigmoid colectomy  AVSS  Abd soft  Dressings dry  Minor bloody BM, ?due to hemorrhoids (large)  Apply ice  DC bishop  Clears  Hb stable (13.6)

## 2024-06-18 NOTE — PLAN OF CARE
Care Coordination Discharge Plan Summary    Admission Assessment Summary    General Information  Readmission Within the last 30 days: no previous admission in last 30 days  Does patient have a : No  Patient-Specific Goals (include timeframe): Return home w/spouse once medically cleared    Living Arrangements  Arrived From: home  Current Living Arrangements: home  People in Home: spouse  Home Accessibility: stairs to enter home (Group), stairs within home (Group)  [place] Ar[place] [facility] [place]. lives w/spouse Jimena.    Social Determinants of Health - Screenings  Housing Stability  In the last 12 months, was there a time when you were not able to pay the mortgage or rent on time?: No  In the last 12 months, was there a time when you did not have a steady place to sleep or slept in a shelter (including now)?: No  Utility Access  In the past 12 months has the electric, gas, oil, or water company threatened to shut off services in your home?: No  Transportation Needs  In the past 12 months, has lack of transportation kept you from medical appointments or from getting medications?: No  In the past 12 months, has lack of transportation kept you from meetings, work, or from getting things needed for daily living?: No    Functional Status Prior to Admission  Assistive Device/Animal Currently Used at Home: none  Functional Status Comments: ambulatory w/out DME, independent w/ADLs  IADL Comments: independent w/IADLs, drives. not a  .    Discharge Needs Assessment    Concerns to be Addressed: discharge planning, care coordination/care conferences  Current Discharge Risk:    Anticipated Changes Related to Illness: none    Discharge Plan Summary    Patient Choice  Offered/Gave Vendor List: no  Patient's Choice of Community Agency(s): n/a       Concerns / Comments:      Discharge Plan:  Disposition/Destination:   /         Connection to Community  Patient declined offered  resources.  Community Resources:      Discharge Transportation:  Is Out of Hospital DNR needed at Discharge: no  Does patient need discharge transport?        PT discussed in rounds, not medically cleared for discharge. Pt is IND at baseline

## 2024-06-18 NOTE — CONSULTS
Consult Note       Subjective     Reason for Consult: Medical management  Consulting Physician:      History of Present Illness: Ino Carrion is a 58 y.o. male   With history of hypertension, A-fib s/p ablation, hyperlipidemia came in for elective laparoscopic assisted sigmoid colect[place] [facility] [place] pain.  Patient reports she was playing golf 6/8/24 when he was bending in certain way and had low back pain on the right side.  Patient reports it is an achy to sharp pain about 15 out of 10 when he tries to get up from laying to sitting or from sitting to standing position.  Reports pain is about 6-7 out of 10 when laying.  No radiation of the right low back pain to the leg.  Patient did not have any issues in the past with his back.  Patient currently has mild nausea and minimal abdominal pain at incision sites.  Denied any lightheadedness or dizziness.  Denied any fever or chills.  Patient had rectal bleeding twice.  No stool in it per him.  Medical History  Past Medical History:   Diagnosis Date    Atrial fibrillation (CMS/HCC)     Ablation done Dr. Ramesh cardiologist    Diverticulitis of colon     3 hospital admissions in 9 months 7/23,11/23,2/24    Hypertension     Kidney stone     Lipid disorder     Psoriasis (a type of skin inflammation)     Being treated for possible plaque psoriasis       Surgical History   Past Surgical History:   Procedure Laterality Date    BACK SURGERY      C5-6 herniated disc 12 years ago    CARDIAC ELECTROPHYSIOLOGY MAPPING AND ABLATION      KNEE ARTHROSCOPY W/ ACL RECONSTRUCTION Right     4 surgeries in the past    LITHOTRIPSY      TONSILLECTOMY         Social History  Social History     Socioeconomic History    Marital status:      Spouse name: None    Number of children: None    Years of education: None    Highest education level: None   Tobacco Use    Smoking status: Never    Smokeless tobacco: Never   Vaping Use    Vaping Use: Never  used   Substance and Sexual Activity    Alcohol use: Yes     Comment: Hasnt had drink since 11-23    Drug use: No    Sexual activity: Defer     Social Determinants of Health     Food Insecurity: No Food Insecurity (6/17/2024)    Hunger Vital Sign     Worried About Running Out of Food in the Last Year: Never true     Ran Out of Food in the Last Year: Never true   Transportation Needs: No Transportation Needs (6/17/2024)    PRAPARE - Transportation     Lack of Transportation (Medical): No     Lack of Transportation (Non-Medical): No   Housing Stability: Unknown (6/17/2024)    Housing Stability Vital Sign     Unable to Pay for Housing in the Last Year: No     Unstable Housing in the Last Year: No       Family History  History reviewed. No pertinent family history.    Allergies  Iodinated contrast media and Amoxicillin    Home Meds    ALPRAZolam, Take 0.5 mg by mouth nightly. for sleep    metFORMIN XR, Take 500 mg by mouth daily.    risankizumab-rzaa (SKYRIZI SUBQ), Inject under the skin. 4 times/yearly    rosuvastatin, daily.    valsartan-hydrochlorothiazide, Take 1 tablet by mouth daily.    Current Meds    acetaminophen, 650 mg, oral, q4h NERI    ALPRAZolam, 0.5 mg, oral, Nightly    calcium carbonate, 1,000 mg, oral, 2x daily PRN    cefTRIAXone, 1 g, intravenous, q24h INT    cyclobenzaprine, 10 mg, oral, BID (9a, 10p)    glucose, 16-32 g of dextrose, oral, PRN **OR** dextrose, 15-30 g of dextrose, oral, PRN **OR** glucagon, 1 mg, intramuscular, PRN **OR** dextrose 50 % in water (D50), 25 mL, intravenous, PRN    potassium chloride-D5-0.45%NaCl, , intravenous, Continuous    diphenhydrAMINE, 25 mg, intravenous, q6h PRN    gabapentin, 200 mg, oral, BID    valsartan, 160 mg, oral, Daily **AND** hydrochlorothiazide, 25 mg, oral, Daily    HYDROmorphone, 1 mg, intravenous, q2h PRN    lidocaine, 1 patch, Topical, Daily    melatonin, 3 mg, oral, Nightly    metroNIDAZOLE, 500 mg, intravenous, q8h INT    ondansetron, 4 mg,  intravenous, q8h PRN    pantoprazole, 40 mg, intravenous, q24h    potassium chloride, 20 mEq, oral, PRN    potassium chloride, 40 mEq, oral, PRN    Review of Systems  Constitutional: Negative for fatigue and unexpected weight change.   Eyes: Negative for visual disturbance.   Respiratory: Negative for cough and shortness of breath.    Cardiovascular: Negative for chest pain and palpitations.   Gastrointestinal: Abdominal pain at incision sites.  Mild nausea present.  2 episodes of bloody bowel movements.   Genitourinary: Has Caceres catheter currently.   Musculoskeletal: Right low back pain as mentioned in HPI.   Skin: Negative for rash.   Neurological: Reports headache bilateral temporal regions.  Hematological: Does not bruise/bleed easily.   Psychiatric/Behavioral: Negative for confusion and dysphoric mood  Rest of the review of systems negative  Vital Signs  Temp:  [36.3 °C (97.4 °F)-36.7 °C (98.1 °F)] 36.4 °C (97.5 °F)  Heart Rate:  [74-98] 74  Resp:  [10-22] 16  BP: (110-163)/(56-94) 135/67     SpO2 Readings from Last 3 Encounters:   06/18/24 98%   02/28/24 97%   11/26/23 98%       Objective     Physical Exam  Constitutional: He is oriented to person, place, and time. He appears well-developed and well-nourished. No acute distress.   HEENT: Normocephalic atraumatic  Eyes-STEPHANI  Neck: No jugular venous distention.  .   Cardiovascular: Normal and regular S1 and S2, no murmurs.  Chest: Clear to auscultation bilaterally, no wheezing.  Abdomin: Soft, bowel sounds are present.  Minimal tenderness left lower quadrant.  Incision sites clean.  Dressings present.  Musculoskeletal: Normal range of motion.  Gentle straight leg raising test positive on the right and cannot raise more than 40 degrees as she gets pain.  Tender right lower back  Neurological: Grossly nonfocal.  Skin: No rash    Labs  Lab Results   Component Value Date    WBC 11.14 (H) 06/18/2024    HGB 13.6 (L) 06/18/2024    HCT 39.9 (L) 06/18/2024    MCV 89.3  06/18/2024     06/18/2024     Lab Results   Component Value Date    GLUCOSE 158 (H) 06/18/2024    CALCIUM 8.8 06/18/2024     06/18/2024    K 4.0 06/18/2024    CO2 27 06/18/2024     06/18/2024    BUN 17 06/18/2024    CREATININE 1.1 06/18/2024     Lab Results   Component Value Date    TROPONINI <0.02 07/10/2021     Lab Results   Component Value Date    ALBUMIN 4.6 02/26/2024    BILITOT 2.9 (H) 02/26/2024    ALKPHOS 50 02/26/2024    AST 11 (L) 02/26/2024    ALT 16 02/26/2024    PROTEIN 7.4 02/26/2024       Imaging:  No results found.      * H/O diverticulitis of colon  Assessment & Plan  Patient had recurrent diverticulitis and had elective laparoscopic assisted sigmoid colectomy on 6/17/2024    Patient had IntraOp cystoscopy and bilateral ureteral stents for identification and Caceres catheter placed by urology    Management as per surgery.  Hydromorphone as needed, Tylenol around-the-clock  N.p.o. as per surgery  2 episodes of rectal bleeding possibly hemorrhoidal bleeding.  Hemoglobin stable    Acute right-sided low back pain without sciatica  Assessment & Plan  Right low back musculoskeletal pain-no radiation to the leg  Continue Tylenol around-the-clock, gabapentin, Flexeril twice daily  Continue Dilaudid as needed  Will place on lidocaine patch    Mixed hyperlipidemia  Assessment & Plan  Statin on hold    Leukocytosis  Assessment & Plan  Possibly stress related.  Monitor    Primary hypertension  Assessment & Plan  Continue valsartan/HCTZ.        Lino Back MD  6/18/2024  10:27 AM

## 2024-06-18 NOTE — ASSESSMENT & PLAN NOTE
Patient had recurrent diverticulitis and had elective laparoscopic assisted sigmoid colectomy on 6/17/2024    Patient had IntraOp cystoscopy and bilateral ureteral stents for identification     Management as per surgery.  Hydromorphone as needed  Full liquids and advance to low residue diet as per surgery yesterday

## 2024-06-19 LAB
ANION GAP SERPL CALC-SCNC: 7 MEQ/L (ref 3–15)
BUN SERPL-MCNC: 14 MG/DL (ref 7–25)
CALCIUM SERPL-MCNC: 8.8 MG/DL (ref 8.6–10.3)
CHLORIDE SERPL-SCNC: 104 MEQ/L (ref 98–107)
CO2 SERPL-SCNC: 28 MEQ/L (ref 21–31)
CREAT SERPL-MCNC: 1 MG/DL (ref 0.7–1.3)
EGFRCR SERPLBLD CKD-EPI 2021: >60 ML/MIN/1.73M*2
ERYTHROCYTE [DISTWIDTH] IN BLOOD BY AUTOMATED COUNT: 13.2 % (ref 11.6–14.4)
ERYTHROCYTE [DISTWIDTH] IN BLOOD BY AUTOMATED COUNT: 13.2 % (ref 11.6–14.4)
GLUCOSE SERPL-MCNC: 116 MG/DL (ref 70–99)
HCT VFR BLD AUTO: 37.1 % (ref 40.1–51)
HCT VFR BLD AUTO: 37.7 % (ref 40.1–51)
HGB BLD-MCNC: 12.5 G/DL (ref 13.7–17.5)
HGB BLD-MCNC: 12.7 G/DL (ref 13.7–17.5)
MAGNESIUM SERPL-MCNC: 2.1 MG/DL (ref 1.8–2.5)
MCH RBC QN AUTO: 30 PG (ref 28–33.2)
MCH RBC QN AUTO: 30.4 PG (ref 28–33.2)
MCHC RBC AUTO-ENTMCNC: 33.7 G/DL (ref 32.2–36.5)
MCHC RBC AUTO-ENTMCNC: 33.7 G/DL (ref 32.2–36.5)
MCV RBC AUTO: 89.2 FL (ref 83–98)
MCV RBC AUTO: 90.2 FL (ref 83–98)
PDW BLD AUTO: 9.3 FL (ref 9.4–12.4)
PDW BLD AUTO: 9.4 FL (ref 9.4–12.4)
PLATELET # BLD AUTO: 221 K/UL (ref 150–350)
PLATELET # BLD AUTO: 232 K/UL (ref 150–350)
POTASSIUM SERPL-SCNC: 3.6 MEQ/L (ref 3.5–5.1)
RBC # BLD AUTO: 4.16 M/UL (ref 4.5–5.8)
RBC # BLD AUTO: 4.18 M/UL (ref 4.5–5.8)
SODIUM SERPL-SCNC: 139 MEQ/L (ref 136–145)
WBC # BLD AUTO: 8.38 K/UL (ref 3.8–10.5)
WBC # BLD AUTO: 9.42 K/UL (ref 3.8–10.5)

## 2024-06-19 PROCEDURE — 63700000 HC SELF-ADMINISTRABLE DRUG: Performed by: NURSE PRACTITIONER

## 2024-06-19 PROCEDURE — 85027 COMPLETE CBC AUTOMATED: CPT | Performed by: HOSPITALIST

## 2024-06-19 PROCEDURE — 63600000 HC DRUGS/DETAIL CODE: Mod: JZ | Performed by: SURGERY

## 2024-06-19 PROCEDURE — 25800000 HC PHARMACY IV SOLUTIONS: Performed by: SURGERY

## 2024-06-19 PROCEDURE — 99232 SBSQ HOSP IP/OBS MODERATE 35: CPT | Performed by: HOSPITALIST

## 2024-06-19 PROCEDURE — 80048 BASIC METABOLIC PNL TOTAL CA: CPT | Performed by: HOSPITALIST

## 2024-06-19 PROCEDURE — 85027 COMPLETE CBC AUTOMATED: CPT | Performed by: SURGERY

## 2024-06-19 PROCEDURE — 83735 ASSAY OF MAGNESIUM: CPT | Performed by: HOSPITALIST

## 2024-06-19 PROCEDURE — 25000000 HC PHARMACY GENERAL: Performed by: SURGERY

## 2024-06-19 PROCEDURE — 63700000 HC SELF-ADMINISTRABLE DRUG: Performed by: HOSPITALIST

## 2024-06-19 PROCEDURE — 12000000 HC ROOM AND CARE MED/SURG

## 2024-06-19 PROCEDURE — 36415 COLL VENOUS BLD VENIPUNCTURE: CPT | Performed by: HOSPITALIST

## 2024-06-19 PROCEDURE — 63700000 HC SELF-ADMINISTRABLE DRUG: Performed by: SURGERY

## 2024-06-19 RX ORDER — ROSUVASTATIN CALCIUM 10 MG/1
10 TABLET, COATED ORAL NIGHTLY
Status: DISCONTINUED | OUTPATIENT
Start: 2024-06-19 | End: 2024-06-21 | Stop reason: HOSPADM

## 2024-06-19 RX ORDER — METFORMIN HYDROCHLORIDE 500 MG/1
500 TABLET, EXTENDED RELEASE ORAL
Status: DISCONTINUED | OUTPATIENT
Start: 2024-06-20 | End: 2024-06-21 | Stop reason: HOSPADM

## 2024-06-19 RX ADMIN — ALPRAZOLAM 0.5 MG: 0.5 TABLET ORAL at 21:22

## 2024-06-19 RX ADMIN — ACETAMINOPHEN 650 MG: 325 TABLET, FILM COATED ORAL at 17:17

## 2024-06-19 RX ADMIN — HYDROMORPHONE HYDROCHLORIDE 1 MG: 1 INJECTION, SOLUTION INTRAMUSCULAR; INTRAVENOUS; SUBCUTANEOUS at 22:04

## 2024-06-19 RX ADMIN — Medication 3 MG: at 21:21

## 2024-06-19 RX ADMIN — HYDROMORPHONE HYDROCHLORIDE 1 MG: 1 INJECTION, SOLUTION INTRAMUSCULAR; INTRAVENOUS; SUBCUTANEOUS at 03:23

## 2024-06-19 RX ADMIN — LIDOCAINE 1 PATCH: 4 PATCH TOPICAL at 08:34

## 2024-06-19 RX ADMIN — HYDROCHLOROTHIAZIDE 25 MG: 25 TABLET ORAL at 08:34

## 2024-06-19 RX ADMIN — ROSUVASTATIN 10 MG: 10 TABLET, FILM COATED ORAL at 21:23

## 2024-06-19 RX ADMIN — PANTOPRAZOLE SODIUM 40 MG: 40 INJECTION, POWDER, LYOPHILIZED, FOR SOLUTION INTRAVENOUS at 08:34

## 2024-06-19 RX ADMIN — GABAPENTIN 200 MG: 100 CAPSULE ORAL at 08:34

## 2024-06-19 RX ADMIN — ACETAMINOPHEN 650 MG: 325 TABLET, FILM COATED ORAL at 21:21

## 2024-06-19 RX ADMIN — VALSARTAN 160 MG: 160 TABLET, FILM COATED ORAL at 08:34

## 2024-06-19 RX ADMIN — CYCLOBENZAPRINE HYDROCHLORIDE 10 MG: 5 TABLET, FILM COATED ORAL at 08:34

## 2024-06-19 RX ADMIN — CEFTRIAXONE SODIUM 1 G: 1 INJECTION, POWDER, FOR SOLUTION INTRAMUSCULAR; INTRAVENOUS at 08:26

## 2024-06-19 RX ADMIN — GABAPENTIN 200 MG: 100 CAPSULE ORAL at 21:22

## 2024-06-19 RX ADMIN — CYCLOBENZAPRINE HYDROCHLORIDE 10 MG: 5 TABLET, FILM COATED ORAL at 21:23

## 2024-06-19 RX ADMIN — POTASSIUM CHLORIDE, DEXTROSE MONOHYDRATE AND SODIUM CHLORIDE: 150; 5; 450 INJECTION, SOLUTION INTRAVENOUS at 08:35

## 2024-06-19 ASSESSMENT — COGNITIVE AND FUNCTIONAL STATUS - GENERAL
CLIMB 3 TO 5 STEPS WITH RAILING: 4 - NONE
MOVING TO AND FROM BED TO CHAIR: 4 - NONE
CLIMB 3 TO 5 STEPS WITH RAILING: 4 - NONE
STANDING UP FROM CHAIR USING ARMS: 4 - NONE
MOVING TO AND FROM BED TO CHAIR: 4 - NONE
STANDING UP FROM CHAIR USING ARMS: 4 - NONE
WALKING IN HOSPITAL ROOM: 4 - NONE
WALKING IN HOSPITAL ROOM: 4 - NONE

## 2024-06-19 NOTE — PROGRESS NOTES
POD#2 sigmoid colectomy  AVSS  OOB  Voiding  + liquid brown BM, dark blood  No pain  Abd soft,nontender  Hb 12.5, WBC 8.4, lytes wnl    Recheck Hb noon  Fulls to low residue diet

## 2024-06-19 NOTE — PROGRESS NOTES
Hospital Medicine Service  Daily Progress Note       SUBJECTIVE     Patient seen earlier today.  Right-sided low back pain much improved.  Able to get up from laying to sitting and from sitting to standing with not much discomfort.     OBJECTIVE        Vital Signs  Temp:  [36.4 °C (97.5 °F)-37.5 °C (99.5 °F)] 37 °C (98.6 °F)  Heart Rate:  [74-86] 80  Resp:  [16-18] 18  BP: (106-130)/(63-92) 130/92     SpO2 Readings from Last 3 Encounters:   06/19/24 96%   02/28/24 97%   11/26/23 98%       I/O last 3 completed shifts:  In: 320 [P.O.:300; I.V.:20]  Out: -     PHYSICAL EXAMINATION        GEN:; not in acute distress  HEENT: normocephalic; atraumatic  ;   CARDIO: regular rate and rhythm;   RESP: decreased at bases  ABD: soft,  normal bowel sounds, minimal tenderness left lower quadrant  EXT: no  edema  NEURO: alert and oriented x 3; nonfocal       LABS / IMAGING / TELE        Labs  Lab Results   Component Value Date    WBC 9.42 06/19/2024    HGB 12.7 (L) 06/19/2024    HCT 37.7 (L) 06/19/2024    MCV 90.2 06/19/2024     06/19/2024     Lab Results   Component Value Date    GLUCOSE 116 (H) 06/19/2024    CALCIUM 8.8 06/19/2024     06/19/2024    K 3.6 06/19/2024    CO2 28 06/19/2024     06/19/2024    BUN 14 06/19/2024    CREATININE 1.0 06/19/2024     Lab Results   Component Value Date    INR 1.1 02/26/2024    INR 1.0 11/27/2017    INR 0.9 10/29/2015       Imaging  No results found.        ASSESSMENT AND PLAN      * H/O diverticulitis of colon  Assessment & Plan  Patient had recurrent diverticulitis and had elective laparoscopic assisted sigmoid colectomy on 6/17/2024    Patient had IntraOp cystoscopy and bilateral ureteral stents for identification and Caceres catheter placed by urology    Management as per surgery.  Hydromorphone as needed, Tylenol around-the-clock  Full liquids and advance to low residue diet as per surgery  Rectal bleeding intermittently-but hemoglobin stable even though dropped from  16-12.7    Acute right-sided low back pain without sciatica  Assessment & Plan  Right low back musculoskeletal pain-no radiation to the leg-improving  Continue Tylenol around-the-clock, gabapentin, Flexeril twice daily  Continue Dilaudid as needed  placed on lidocaine patch    Mixed hyperlipidemia  Assessment & Plan  Statin on hold    Leukocytosis  Assessment & Plan  Possibly stress related.  Monitor    Primary hypertension  Assessment & Plan  Continue valsartan/HCTZ.         VTE Assessment: scd  Code Status: Full Code  Estimated discharge date: 6/20/2024     Lino Back MD  6/19/2024  7:26 PM

## 2024-06-20 PROBLEM — D72.829 LEUKOCYTOSIS: Status: RESOLVED | Noted: 2024-06-18 | Resolved: 2024-06-20

## 2024-06-20 LAB
ANION GAP SERPL CALC-SCNC: 8 MEQ/L (ref 3–15)
BASOPHILS # BLD: 0.06 K/UL (ref 0.01–0.1)
BASOPHILS NFR BLD: 0.9 %
BUN SERPL-MCNC: 17 MG/DL (ref 7–25)
CALCIUM SERPL-MCNC: 9.3 MG/DL (ref 8.6–10.3)
CHLORIDE SERPL-SCNC: 101 MEQ/L (ref 98–107)
CO2 SERPL-SCNC: 29 MEQ/L (ref 21–31)
CREAT SERPL-MCNC: 0.9 MG/DL (ref 0.7–1.3)
DIFFERENTIAL METHOD BLD: ABNORMAL
EGFRCR SERPLBLD CKD-EPI 2021: >60 ML/MIN/1.73M*2
EOSINOPHIL # BLD: 0.27 K/UL (ref 0.04–0.54)
EOSINOPHIL NFR BLD: 3.9 %
ERYTHROCYTE [DISTWIDTH] IN BLOOD BY AUTOMATED COUNT: 12.9 % (ref 11.6–14.4)
GLUCOSE SERPL-MCNC: 108 MG/DL (ref 70–99)
HCT VFR BLD AUTO: 37.4 % (ref 40.1–51)
HGB BLD-MCNC: 12.5 G/DL (ref 13.7–17.5)
IMM GRANULOCYTES # BLD AUTO: 0.02 K/UL (ref 0–0.08)
IMM GRANULOCYTES NFR BLD AUTO: 0.3 %
LYMPHOCYTES # BLD: 2.5 K/UL (ref 1.2–3.5)
LYMPHOCYTES NFR BLD: 36 %
MAGNESIUM SERPL-MCNC: 2 MG/DL (ref 1.8–2.5)
MCH RBC QN AUTO: 29.8 PG (ref 28–33.2)
MCHC RBC AUTO-ENTMCNC: 33.4 G/DL (ref 32.2–36.5)
MCV RBC AUTO: 89.3 FL (ref 83–98)
MONOCYTES # BLD: 0.62 K/UL (ref 0.3–1)
MONOCYTES NFR BLD: 8.9 %
NEUTROPHILS # BLD: 3.47 K/UL (ref 1.7–7)
NEUTS SEG NFR BLD: 50 %
NRBC BLD-RTO: 0 %
PDW BLD AUTO: 9.3 FL (ref 9.4–12.4)
PLATELET # BLD AUTO: 218 K/UL (ref 150–350)
POTASSIUM SERPL-SCNC: 3.7 MEQ/L (ref 3.5–5.1)
RBC # BLD AUTO: 4.19 M/UL (ref 4.5–5.8)
SODIUM SERPL-SCNC: 138 MEQ/L (ref 136–145)
WBC # BLD AUTO: 6.94 K/UL (ref 3.8–10.5)

## 2024-06-20 PROCEDURE — 83735 ASSAY OF MAGNESIUM: CPT | Performed by: HOSPITALIST

## 2024-06-20 PROCEDURE — 25000000 HC PHARMACY GENERAL: Performed by: SURGERY

## 2024-06-20 PROCEDURE — 63700000 HC SELF-ADMINISTRABLE DRUG: Performed by: SURGERY

## 2024-06-20 PROCEDURE — 85025 COMPLETE CBC W/AUTO DIFF WBC: CPT | Performed by: SURGERY

## 2024-06-20 PROCEDURE — 12000000 HC ROOM AND CARE MED/SURG

## 2024-06-20 PROCEDURE — 63700000 HC SELF-ADMINISTRABLE DRUG: Performed by: HOSPITALIST

## 2024-06-20 PROCEDURE — 36415 COLL VENOUS BLD VENIPUNCTURE: CPT | Performed by: HOSPITALIST

## 2024-06-20 PROCEDURE — 99232 SBSQ HOSP IP/OBS MODERATE 35: CPT | Performed by: HOSPITALIST

## 2024-06-20 PROCEDURE — 63700000 HC SELF-ADMINISTRABLE DRUG: Performed by: NURSE PRACTITIONER

## 2024-06-20 PROCEDURE — 63600000 HC DRUGS/DETAIL CODE: Performed by: SURGERY

## 2024-06-20 PROCEDURE — 63600000 HC DRUGS/DETAIL CODE: Performed by: SPEECH-LANGUAGE PATHOLOGIST

## 2024-06-20 PROCEDURE — 80048 BASIC METABOLIC PNL TOTAL CA: CPT | Performed by: HOSPITALIST

## 2024-06-20 RX ORDER — HYDROMORPHONE HYDROCHLORIDE 1 MG/ML
0.25 INJECTION, SOLUTION INTRAMUSCULAR; INTRAVENOUS; SUBCUTANEOUS ONCE
Status: COMPLETED | OUTPATIENT
Start: 2024-06-20 | End: 2024-06-20

## 2024-06-20 RX ORDER — ACETAMINOPHEN 325 MG/1
650 TABLET ORAL EVERY 6 HOURS PRN
Status: DISCONTINUED | OUTPATIENT
Start: 2024-06-20 | End: 2024-06-21 | Stop reason: HOSPADM

## 2024-06-20 RX ORDER — KETOROLAC TROMETHAMINE 15 MG/ML
15 INJECTION, SOLUTION INTRAMUSCULAR; INTRAVENOUS EVERY 6 HOURS PRN
Status: DISCONTINUED | OUTPATIENT
Start: 2024-06-20 | End: 2024-06-21 | Stop reason: HOSPADM

## 2024-06-20 RX ADMIN — CYCLOBENZAPRINE HYDROCHLORIDE 10 MG: 5 TABLET, FILM COATED ORAL at 22:01

## 2024-06-20 RX ADMIN — KETOROLAC TROMETHAMINE 15 MG: 15 INJECTION, SOLUTION INTRAMUSCULAR; INTRAVENOUS at 16:20

## 2024-06-20 RX ADMIN — HYDROMORPHONE HYDROCHLORIDE 1 MG: 1 INJECTION, SOLUTION INTRAMUSCULAR; INTRAVENOUS; SUBCUTANEOUS at 12:20

## 2024-06-20 RX ADMIN — METFORMIN HYDROCHLORIDE 500 MG: 500 TABLET, EXTENDED RELEASE ORAL at 08:40

## 2024-06-20 RX ADMIN — PANTOPRAZOLE SODIUM 40 MG: 40 INJECTION, POWDER, LYOPHILIZED, FOR SOLUTION INTRAVENOUS at 08:41

## 2024-06-20 RX ADMIN — HYDROMORPHONE HYDROCHLORIDE 0.25 MG: 0.5 INJECTION, SOLUTION INTRAMUSCULAR; INTRAVENOUS; SUBCUTANEOUS at 05:57

## 2024-06-20 RX ADMIN — GABAPENTIN 200 MG: 100 CAPSULE ORAL at 20:01

## 2024-06-20 RX ADMIN — ACETAMINOPHEN 650 MG: 325 TABLET, FILM COATED ORAL at 13:19

## 2024-06-20 RX ADMIN — HYDROMORPHONE HYDROCHLORIDE 1 MG: 1 INJECTION, SOLUTION INTRAMUSCULAR; INTRAVENOUS; SUBCUTANEOUS at 22:02

## 2024-06-20 RX ADMIN — CYCLOBENZAPRINE HYDROCHLORIDE 10 MG: 5 TABLET, FILM COATED ORAL at 08:39

## 2024-06-20 RX ADMIN — LIDOCAINE 1 PATCH: 4 PATCH TOPICAL at 08:41

## 2024-06-20 RX ADMIN — ALPRAZOLAM 0.5 MG: 0.5 TABLET ORAL at 22:01

## 2024-06-20 RX ADMIN — HYDROCHLOROTHIAZIDE 25 MG: 25 TABLET ORAL at 08:40

## 2024-06-20 RX ADMIN — HYDROMORPHONE HYDROCHLORIDE 1 MG: 1 INJECTION, SOLUTION INTRAMUSCULAR; INTRAVENOUS; SUBCUTANEOUS at 00:36

## 2024-06-20 RX ADMIN — GABAPENTIN 200 MG: 100 CAPSULE ORAL at 08:41

## 2024-06-20 RX ADMIN — ACETAMINOPHEN 650 MG: 325 TABLET, FILM COATED ORAL at 08:41

## 2024-06-20 RX ADMIN — Medication 3 MG: at 22:01

## 2024-06-20 RX ADMIN — VALSARTAN 160 MG: 160 TABLET, FILM COATED ORAL at 08:40

## 2024-06-20 RX ADMIN — ROSUVASTATIN 10 MG: 10 TABLET, FILM COATED ORAL at 22:01

## 2024-06-20 ASSESSMENT — COGNITIVE AND FUNCTIONAL STATUS - GENERAL
STANDING UP FROM CHAIR USING ARMS: 4 - NONE
CLIMB 3 TO 5 STEPS WITH RAILING: 4 - NONE
MOVING TO AND FROM BED TO CHAIR: 4 - NONE
STANDING UP FROM CHAIR USING ARMS: 4 - NONE
MOVING TO AND FROM BED TO CHAIR: 4 - NONE
WALKING IN HOSPITAL ROOM: 4 - NONE
CLIMB 3 TO 5 STEPS WITH RAILING: 4 - NONE
WALKING IN HOSPITAL ROOM: 4 - NONE

## 2024-06-20 NOTE — PROGRESS NOTES
POD#3 sigmoid colectomy  Johnson reg low res diet  Abd soft, nontender  OOB  AVSS  C/O low back pain  Flexeril and pain meds helping, heating pad  No further blood in BM; Hb stable at 12.5  Will add Toradol  Probable d/c tomorrow am

## 2024-06-20 NOTE — PLAN OF CARE
Plan of Care Review  Plan of Care Reviewed With: patient  Progress: improving  Outcome Evaluation: Pt a&ox4, resting comfortably in bed. VSS on RA. IVF infusing per order. PRN IV dilaudid given for severe back and incision pain. Abd dressings c/d/i.  Ambulating independently in room. Call bell within reach.

## 2024-06-20 NOTE — PLAN OF CARE
Plan of Care Review  Plan of Care Reviewed With: patient  Progress: improving  Outcome Evaluation: Patient AAOx4, VSS. Complains of ongoing R lower back pain, PRN dilaudud and toradol given throughout shift. Tolerating regular low residue diet, PO intake. IVF dc'd. Abdominal incisions CDI, dressings in place. Ambulated independently without difficulty. Call bell within reach.

## 2024-06-20 NOTE — PROGRESS NOTES
Hospital Medicine Service -  Daily Progress Note       SUBJECTIVE   Doing well  Tolerates diet  Having bowel movements, the last 1 without blood    Still with right-sided lower back pain, no radiation  Toradol helps the most   OBJECTIVE      Vital signs in last 24 hours:  Temp:  [36.1 °C (97 °F)-37 °C (98.6 °F)] 36.1 °C (97 °F)  Heart Rate:  [] 107  Resp:  [16-18] 18  BP: ()/(60-92) 123/89    Intake/Output Summary (Last 24 hours) at 6/20/2024 1724  Last data filed at 6/19/2024 2000  Gross per 24 hour   Intake 10 ml   Output --   Net 10 ml       PHYSICAL EXAMINATION      Physical Exam  Vitals and nursing note reviewed.   Constitutional:       Appearance: Normal appearance. He is well-developed and normal weight.   Eyes:      General: No scleral icterus.  Cardiovascular:      Rate and Rhythm: Normal rate and regular rhythm.      Heart sounds: Normal heart sounds.   Pulmonary:      Effort: Pulmonary effort is normal.      Breath sounds: Normal breath sounds.   Abdominal:      General: Bowel sounds are normal.      Palpations: Abdomen is soft. There is no mass.      Tenderness: There is abdominal tenderness.   Musculoskeletal:         General: No swelling.      Cervical back: Neck supple.   Neurological:      Mental Status: He is alert and oriented to person, place, and time.   Psychiatric:         Behavior: Behavior normal.        LABS / IMAGING / TELE      Labs  I have reviewed the patient's labs.  Hemoglobin 12.5 WBC 6.9    Imaging      ECG/Telemetry      ASSESSMENT AND PLAN      * H/O diverticulitis of colon  Assessment & Plan  Patient had recurrent diverticulitis and had elective laparoscopic assisted sigmoid colectomy on 6/17/2024    Patient had IntraOp cystoscopy and bilateral ureteral stents for identification     Management as per surgery.  Hydromorphone as needed  Full liquids and advance to low residue diet as per surgery yesterday      Acute right-sided low back pain without  sciatica  Assessment & Plan  Right low back musculoskeletal pain-no radiation to the leg-improving  Continue Lidoderm, gabapentin, Flexeril   Continue Toradol and Dilaudid as needed      Mixed hyperlipidemia  Assessment & Plan  Continue statin    Primary hypertension  Assessment & Plan  Continue valsartan/HCTZ.  Blood pressure is controlled         VTE Assessment: Padua    Code Status: Full Code  Estimated discharge date: 6/20/2024     Alyssia Betancur MD  6/20/2024                 done

## 2024-06-21 VITALS
HEIGHT: 74 IN | OXYGEN SATURATION: 94 % | TEMPERATURE: 97.1 F | RESPIRATION RATE: 18 BRPM | WEIGHT: 205 LBS | BODY MASS INDEX: 26.31 KG/M2 | HEART RATE: 76 BPM | SYSTOLIC BLOOD PRESSURE: 102 MMHG | DIASTOLIC BLOOD PRESSURE: 86 MMHG

## 2024-06-21 PROCEDURE — 63700000 HC SELF-ADMINISTRABLE DRUG: Performed by: HOSPITALIST

## 2024-06-21 PROCEDURE — 63700000 HC SELF-ADMINISTRABLE DRUG: Performed by: SURGERY

## 2024-06-21 RX ORDER — OXYCODONE AND ACETAMINOPHEN 5; 325 MG/1; MG/1
1-2 TABLET ORAL EVERY 6 HOURS PRN
Qty: 12 TABLET | Refills: 0 | Status: SHIPPED | OUTPATIENT
Start: 2024-06-21 | End: 2024-06-26

## 2024-06-21 RX ORDER — CYCLOBENZAPRINE HCL 10 MG
10 TABLET ORAL 2 TIMES DAILY PRN
Qty: 20 TABLET | Refills: 0 | Status: SHIPPED | OUTPATIENT
Start: 2024-06-21 | End: 2024-07-05

## 2024-06-21 RX ADMIN — METFORMIN HYDROCHLORIDE 500 MG: 500 TABLET, EXTENDED RELEASE ORAL at 09:00

## 2024-06-21 RX ADMIN — GABAPENTIN 200 MG: 100 CAPSULE ORAL at 09:00

## 2024-06-21 RX ADMIN — VALSARTAN 160 MG: 160 TABLET, FILM COATED ORAL at 09:00

## 2024-06-21 RX ADMIN — LIDOCAINE 1 PATCH: 4 PATCH TOPICAL at 09:00

## 2024-06-21 RX ADMIN — HYDROCHLOROTHIAZIDE 25 MG: 25 TABLET ORAL at 09:00

## 2024-06-21 RX ADMIN — CYCLOBENZAPRINE HYDROCHLORIDE 10 MG: 5 TABLET, FILM COATED ORAL at 09:00

## 2024-06-21 ASSESSMENT — COGNITIVE AND FUNCTIONAL STATUS - GENERAL
STANDING UP FROM CHAIR USING ARMS: 4 - NONE
WALKING IN HOSPITAL ROOM: 4 - NONE
CLIMB 3 TO 5 STEPS WITH RAILING: 4 - NONE
MOVING TO AND FROM BED TO CHAIR: 4 - NONE

## 2024-06-21 NOTE — DISCHARGE INSTRUCTIONS
POSTOP INSTRUCTIONS:  DIAGNOSTIC LAPAROSCOPY    PAIN:  You may have some incisional discomfort. A narcotic prescription is provided.  Tylenol or Ibuprofen may be adequate.  Apply ice intermittently to the incisions for the first 48 hours.    ACTIVITY:  You may resume normal daily activity, but refrain from heavy lifting, bending or twisting for two weeks.  No exercise, sit ups or abdominal straining.  Do not lift over 15 - 20 pounds.    INCISION:  You may remove the bandages in 2 days. Keep them dry until then.  In 2 days, when you remove the bandages, you will see steri strips (butterfly bandaids). These can get wet, so you may shower. Pat them dry. They will fall off on their own in 2 weeks.    FOOD:  Eat lightly today.  Resume a regular low fiber diet. May increase to some fiber over next 2-3 days. Take daily metamucil.    FOLLOW Up:  Call [phone] to schedule postop f/u visit in 10 - 14 days.  Call for any unusual development, including fever, redness at the incisions, drainage from the incisions, pain, etc.

## 2024-06-21 NOTE — PROGRESS NOTES
AVSS  OOB, showered, +BM, + flatus, voiding well  Abd soft, incision healing well  Johnson low residue diet  OK for d/c

## 2024-06-21 NOTE — DISCHARGE SUMMARY
Inpatient Discharge Summary    BRIEF OVERVIEW  Admitting Provider: Cooper Hastings MD  Discharge Provider: Cooper Hastings MD  Primary Care Physician at Discharge: Felix Wynn DO FABIOLA [phone]     Admission Date: 6/17/2024     Discharge Date: 6/21/2024    Primary Discharge Diagnosis  H/O diverticulitis of colon    Secondary Discharge Diagnosis      Discharge Disposition     Code Status at Discharge: Full Code    Discharge Medications     Medication List        ASK your doctor about these medications      ALPRAZolam 0.5 mg tablet  Commonly known as: XANAX  Take 0.5 mg by mouth nightly. for sleep  Dose: 0.5 mg     CRESTOR 10 mg tablet  daily.  Generic drug: rosuvastatin     metFORMIN  mg 24 hr tablet  Commonly known as: GLUCOPHAGE-XR  Take 500 mg by mouth daily.  Dose: 500 mg     SKYRIZI SUBQ  Inject under the skin. 4 times/yearly     valsartan-hydrochlorothiazide 160-25 mg per tablet  Commonly known as: DIOVAN-HCT  Take 1 tablet by mouth daily.  Dose: 1 tablet              Active Issues Requiring Follow-up  postop    Outpatient Follow-Up  Encounter Information    This patient does not currently have any appointments scheduled.             Test Results Pending at Discharge  Unresulted Labs (From admission, onward)       Start     Ordered    06/17/24 0712  Type and screen  Once        Question Answer Comment   Are you aware of this patient having previously identified antibodies? NO    Does this Patient have Sickle Cell Disease/Sickle Cell Anemia? NO    Release to patient Immediate        06/17/24 0711                    DETAILS OF HOSPITAL STAY    Presenting Problem/History of Present Illness  Diverticulitis large intestine w/o perforation or abscess w/o bleeding [K57.32]      Operative Procedures Performed  Procedure(s):  LAPAROSCOPIC ASSISTED SIGMOID COLECTOMY, LIGHT STENTS  Left Ureteral Lighted Stent Placement      Hospital Course  Uneventful recovery of bowel function postop laparoscopic  sigmoidectomy    Discharge Disposition  Disposition:    Destination:

## 2024-07-24 ENCOUNTER — OFFICE VISIT (OUTPATIENT)
Dept: SURGERY | Facility: CLINIC | Age: 59
End: 2024-07-24
Payer: COMMERCIAL

## 2024-07-24 VITALS
HEART RATE: 90 BPM | RESPIRATION RATE: 18 BRPM | BODY MASS INDEX: 26.69 KG/M2 | DIASTOLIC BLOOD PRESSURE: 82 MMHG | HEIGHT: 74 IN | WEIGHT: 208 LBS | SYSTOLIC BLOOD PRESSURE: 120 MMHG | OXYGEN SATURATION: 100 %

## 2024-07-24 DIAGNOSIS — Z87.19 H/O DIVERTICULITIS OF COLON: Primary | ICD-10-CM

## 2024-07-24 PROCEDURE — 99024 POSTOP FOLLOW-UP VISIT: CPT | Performed by: SURGERY

## 2024-07-24 NOTE — PROGRESS NOTES
Chief Complaint:   Chief Complaint   Patient presents with    Follow-up     S/P colon resection June 2024.   .    HPI     Patient is a 58 y.o. male who presents with the following: Postop check status post laparoscopic sigmoid colectomy for severe acute and chronic diverticulitis with perforation and abscess formation.     Medical History:   Past Medical History:   Diagnosis Date    Atrial fibrillation (CMS/HCC)     Ablation done Dr. Ramesh cardiologist    Diverticulitis of colon     3 hospital admissions in 9 months 7/23,11/23,2/24    Hypertension     Kidney stone     Lipid disorder     Psoriasis (a type of skin inflammation)     Being treated for possible plaque psoriasis       Surgical History:   Past Surgical History:   Procedure Laterality Date    BACK SURGERY      C5-6 herniated disc 12 years ago    BOWEL RESECTION  06/2024    CARDIAC ELECTROPHYSIOLOGY MAPPING AND ABLATION      CERVICAL DISC SURGERY  2015    KNEE ARTHROSCOPY W/ ACL RECONSTRUCTION Right     4 surgeries in the past    LITHOTRIPSY      TONSILLECTOMY         Social History:   Social History     Tobacco Use    Smoking status: Never    Smokeless tobacco: Never   Vaping Use    Vaping Use: Never used   Substance Use Topics    Alcohol use: Not Currently    Drug use: No       Family History:   History reviewed. No pertinent family history.    Allergies:   Iodinated contrast media and Amoxicillin    Current Medications:    Current Outpatient Medications   Medication Instructions    ALPRAZolam (XANAX) 0.5 mg, oral, Nightly, for sleep    cyclobenzaprine (FLEXERIL) 10 mg, oral, 2 times daily PRN    metFORMIN XR (GLUCOPHAGE-XR) 500 mg, oral, Daily    risankizumab-rzaa (SKYRIZI SUBQ) subcutaneous, 4 times/yearly    rosuvastatin (CRESTOR) 10 mg tablet Every 24 hours interval    valsartan-hydrochlorothiazide (DIOVAN-HCT) 160-25 mg per tablet 1 tablet, oral, Daily        Review of Systems  Review of Systems         Visit Vitals  /82 (BP Location: Left  "upper arm, Patient Position: Sitting)   Pulse 90   Resp 18   Ht 1.88 m (6' 2\")   Wt 94.3 kg (208 lb)   SpO2 100%   BMI 26.71 kg/m²        Physical Exam  Gen: NAD  Affect: Nl  Gait: Nl  HEENT: NC/AT, no scleral icterus  Neck: Supple  Pulse: regular  Extremities: no edema  Incisions well-healed abdomen soft flat nontender    Laboratory:  CBC  Lab Results   Component Value Date    WBC 6.94 06/20/2024    RBC 4.19 (L) 06/20/2024    HGB 12.5 (L) 06/20/2024    HCT 37.4 (L) 06/20/2024    MCV 89.3 06/20/2024    MCH 29.8 06/20/2024    MCHC 33.4 06/20/2024    RDW 12.9 06/20/2024     06/20/2024        CMP  Lab Results   Component Value Date    GLUCOSE 108 (H) 06/20/2024    BUN 17 06/20/2024    CREATININE 0.9 06/20/2024    EGFR >60.0 06/20/2024     06/20/2024    K 3.7 06/20/2024     06/20/2024    CO2 29 06/20/2024    CALCIUM 9.3 06/20/2024    PROTEIN 7.4 02/26/2024    ALBUMIN 4.6 02/26/2024    BILITOT 2.9 (H) 02/26/2024    ALKPHOS 50 02/26/2024    AST 11 (L) 02/26/2024    ALT 16 02/26/2024          Radiology:     Assessment/Plan:  Doing beautifully now about a month status post laparoscopic sigmoid colectomy and follow-up hemorrhoidectomy.  He is back to normal activities and diet is having normal bowel movements and is quite delighted with his results.    Diagnoses and all orders for this visit:    H/O diverticulitis of colon (Primary)         Cooper Hastings MD   7/24/2024   "

## (undated) DEVICE — PENEVAC1 NONSTICK SMOKE EVAC

## (undated) DEVICE — SYRINGE DISP LUER-LOK 50 CC

## (undated) DEVICE — SOLN IRRIG .9%SOD 1000ML

## (undated) DEVICE — Device

## (undated) DEVICE — CATH OPEN ENDED 5FR

## (undated) DEVICE — TIPS SCISSOR MICROLINE LAP

## (undated) DEVICE — SUTURE MAXON 0 GREEN 1X36 GS-21

## (undated) DEVICE — MANIFOLD FOUR PORT NEPTUNE

## (undated) DEVICE — TROCAR BLUNT TIP 12 X 100MM

## (undated) DEVICE — OPTIFOAM GENTLE BRDR

## (undated) DEVICE — SOLN IV 0.9% NSS 1000ML

## (undated) DEVICE — RELOAD ENDO GIA45 ARTICULATING THICK MEDIUM

## (undated) DEVICE — BLADE SCALPEL #11

## (undated) DEVICE — PACK RFID LAP CHOLE PMH

## (undated) DEVICE — SPONGE LAP 18X18 SAFE-T RFID ENHANCED XRAY

## (undated) DEVICE — SUTURE BIOSYN 3-0 UNDYED 1X18 P-12

## (undated) DEVICE — CORD LAPAROSCOPIC DISP STERILE

## (undated) DEVICE — TOWEL SURGICAL W17XL27IN BLUE COTTON STANDARD PREWASHED DELI

## (undated) DEVICE — SUTURE SOFSILK 3-0 BLACK 5X18 V-20 D-TACH

## (undated) DEVICE — TUBING INSUFFLATION PNEUMOSURE HIGH FLOW

## (undated) DEVICE — GLOVE SZ 7 PROTEXIS PI

## (undated) DEVICE — GUIDEWIRE UROLOGICAL L150CM DIA0.038IN DISTAL TIP 3CM FLEXIB

## (undated) DEVICE — IMPLANTABLE DEVICE
Type: IMPLANTABLE DEVICE | Status: NON-FUNCTIONAL
Removed: 2024-06-17

## (undated) DEVICE — APPLICATOR CHLORAPREP 26ML ORANGE TINT

## (undated) DEVICE — STAPLER PURSTRING INST 65

## (undated) DEVICE — SUTURE POLYSORB 4-0 UNDYED 1X18 P-12

## (undated) DEVICE — SEALER/DIVIDER LIGASURE BLUNT TIP LAPAROSCOPIC 5 MM

## (undated) DEVICE — SUTURE POLYSORB 0 VIOLET 1X30 GU-46

## (undated) DEVICE — HEEL  ELBOW PROTECTOR

## (undated) DEVICE — RELOAD TRI-STAPLE 60MM PURPLE

## (undated) DEVICE — PAD GROUND ELECTROSURGICAL W/CORD

## (undated) DEVICE — MANIFOLD SINGLE PORT NEPTUNE

## (undated) DEVICE — CATH URETERAL 5FR 70CM

## (undated) DEVICE — TROCAR XCEL DILATING TIP 11 X 100 D11LT

## (undated) DEVICE — TROCAR XCEL DILATING TIP 5 X 100 D5LT

## (undated) DEVICE — DRESSING TEGADERM

## (undated) DEVICE — GRASPER BABCOCK ENDO 5MM

## (undated) DEVICE — DISSECTOR ENDO BLUNT TIP 10MM

## (undated) DEVICE — VERSAONE FASCIAL SYS OPTICAL

## (undated) DEVICE — EVACUATOR PLUME-AWAY LAP SMOKE PKG

## (undated) DEVICE — SUTURE POLYSORB 3-0 UNDYED 1X30 V-20

## (undated) DEVICE — ISOVUE-300 61% BULK

## (undated) DEVICE — TROCAR XCEL DILATING TIP 12 X 100 D12LT

## (undated) DEVICE — SLEEVE SCD COMFORT KNEE LENGTH MED

## (undated) DEVICE — GRASP ENDO 5MM CLINCH

## (undated) DEVICE — SUTURE CHROMIC 2-0 GUT UNDYED 1X30 V-20

## (undated) DEVICE — ***USE 149534*** FOG-OUT ANTI-FOG MEDC

## (undated) DEVICE — PACK RFID CYSTOSCOPY

## (undated) DEVICE — TRISTAPLER EEA MEDIUM THICK PURPLE 28MM

## (undated) DEVICE — STAPLER ENDO GIA UNIVERSA ULTRA 12MM

## (undated) DEVICE — TRAY URINE METER FOLEY NON-SILVER

## (undated) DEVICE — LEGGINGS CLEAR PAIR

## (undated) DEVICE — SOLN IRRIG STER WATER 1000ML